# Patient Record
Sex: FEMALE | Race: WHITE | Employment: UNEMPLOYED | URBAN - METROPOLITAN AREA
[De-identification: names, ages, dates, MRNs, and addresses within clinical notes are randomized per-mention and may not be internally consistent; named-entity substitution may affect disease eponyms.]

---

## 2017-01-19 ENCOUNTER — OFFICE VISIT (OUTPATIENT)
Dept: BEHAVIORAL/MENTAL HEALTH CLINIC | Age: 27
End: 2017-01-19

## 2017-01-19 VITALS
DIASTOLIC BLOOD PRESSURE: 73 MMHG | BODY MASS INDEX: 20.8 KG/M2 | SYSTOLIC BLOOD PRESSURE: 110 MMHG | HEIGHT: 62 IN | WEIGHT: 113 LBS | HEART RATE: 88 BPM

## 2017-01-19 DIAGNOSIS — F41.9 ANXIETY: ICD-10-CM

## 2017-01-19 DIAGNOSIS — F31.76 BIPOLAR 1 DISORDER, DEPRESSED, FULL REMISSION (HCC): Primary | ICD-10-CM

## 2017-01-19 DIAGNOSIS — F41.0 PANIC ATTACKS: ICD-10-CM

## 2017-01-19 RX ORDER — CLONAZEPAM 0.5 MG/1
0.5 TABLET ORAL
Qty: 45 TAB | Refills: 1 | Status: SHIPPED | OUTPATIENT
Start: 2017-01-19 | End: 2017-03-30 | Stop reason: SDUPTHER

## 2017-01-19 NOTE — PROGRESS NOTES
CHIEF COMPLAINT:  Marissa Ramirez is a 27 y.o. female and was seen today for follow-up of psychiatric condition and psychotropic medication management. HPI:    Chavez Jaeger reports the following psychiatric symptoms:  depression, anxiety and margoth. The symptoms of anxiety have been present for months and are of moderate/low severity. Bipolar symptoms have been stable now for several months. Overall symptoms occur less frequently and pt has been working on awareness of anxiety in particular. She reports mood is stable and is here today to review tx plan. FAMILY/SOCIAL HX: conts to experience work stress    REVIEW OF SYSTEMS:  Psychiatric:  depression, anxiety, margoth  Appetite:good   Sleep: fitful, has been working to wean off of klonopin as a sleep aid, typically takes 0.5 mg to 0.75 mg   Neuro: denies    Visit Vitals    /73    Pulse 88    Ht 5' 2\" (1.575 m)    Wt 51.3 kg (113 lb)    BMI 20.67 kg/m2       Side Effects:  none    MENTAL STATUS EXAM:   Sensorium  oriented to time, place and person   Relations cooperative   Appearance:  age appropriate and casually dressed   Motor Behavior:  gait stable and within normal limits   Speech:  soft   Thought Process: goal directed   Thought Content free of delusions and free of hallucinations   Suicidal ideations no intention   Homicidal ideations no intention   Mood:  anxious and within normal limits   Affect:  Wnl, sl anxious   Memory recent  adequate   Memory remote:  adequate   Concentration:  adequate   Abstraction:  abstract   Insight:  fair and good   Reliability good   Judgment:  fair and good     MEDICAL DECISION MAKING:  Problems addressed today:    ICD-10-CM ICD-9-CM    1. Bipolar 1 disorder, depressed, full remission ([facility] F31.76 296.56    2. Anxiety F41.9 300.00    3. Panic attacks F41.0 300.01        Assessment:   Chavez Jaeger is responding to treatment. Bipolar symptoms are stable at this time.  Reviewed contributing factors and pt open/aware of risk factors for not being on medication. Continue to discuss factors to build MH and decrease stress. She still has some intermittent anxiety but is continuing to work to develop good coping strategies. Reviewed sleep meds. Pt not comfortable using an antidepressant due to concern about symptom exacerbation in the past from an antidepressant. Will dc it today. Pt will continue to work on weaning off of klonopin as a sleep aid. Currently she does not sleep well without it so will continue to wean slowly. Discussed dosing options. Provided psychoeducation re: bipolar management/stress management. Provided support and encouragement. Plan:   1. Current Outpatient Prescriptions   Medication Sig Dispense Refill    clonazePAM (KLONOPIN) 0.5 mg tablet Take 1 Tab by mouth two (2) times daily as needed. Max Daily Amount: 1 mg. 45 Tab 1          medication changes made today: dc doxepin, decrease klonopin to 0.5 mg tab 1 bid prn or 1-1.5 nightly as needed for sleep    2. Counseling and coordination of care including instructions for treatment, risks/benefits, risk factor reduction and patient/family education. She agrees with the plan. Patient instructed to call with any side effects, questions or issues. 3.  Follow-up Disposition:  Return in about 8 weeks (around 3/16/2017).      4. Ind therapy    1/19/2017  Roxana Ch NP

## 2017-01-19 NOTE — MR AVS SNAPSHOT
Visit Information Date & Time Provider Department Dept. Phone Encounter #  
 1/19/2017 10:00 AM Gaston Reyes NP [address] [facility] [phone] [id number] Upcoming Health Maintenance Date Due  
 HPV AGE 9Y-34Y (1 of 3 - Female 3 Dose Series) 5/22/2003 Pneumococcal 19-64 Medium Risk (1 of 1 - PPSV23) 5/22/2011 DTaP/Tdap/Td series (1 - Tdap) 5/22/2013 PAP AKA CERVICAL CYTOLOGY 5/22/2013 INFLUENZA AGE 9 TO ADULT 8/1/2016 Allergies as of 1/19/2017  Review Complete On: 1/19/2017 By: Gaston Reyes NP Severity Noted Reaction Type Reactions Cephalosporins  05/11/2011    Rash Seafood [Shellfish Containing Products]  05/11/2011    Shortness of Breath, Rash Current Immunizations  Never Reviewed No immunizations on file. Not reviewed this visit You Were Diagnosed With   
  
 Codes Comments Bipolar 1 disorder, depressed, full remission ([facility]    -  Primary ICD-10-CM: F31.76 
ICD-9-CM: 296.56 Anxiety     ICD-10-CM: F41.9 ICD-9-CM: 300.00 Panic attacks     ICD-10-CM: F41.0 ICD-9-CM: 300.01 Vitals BP Pulse Height(growth percentile) Weight(growth percentile) BMI OB Status 110/73 88 5' 2\" (1.575 m) 113 lb (51.3 kg) 20.67 kg/m2 Having regular periods Smoking Status Current Every Day Smoker Vitals History BMI and BSA Data Body Mass Index Body Surface Area  
 20.67 kg/m 2 1.5 m 2 Preferred Pharmacy Pharmacy Name Phone [address] [place] [address] [phone] [phone] Your Updated Medication List  
  
   
This list is accurate as of: 1/19/17 10:41 AM.  Always use your most recent med list.  
  
  
  
  
 clonazePAM 0.5 mg tablet Commonly known as:  So Sweetze Take 1 Tab by mouth two (2) times daily as needed. Max Daily Amount: 1 mg. Prescriptions Printed Refills clonazePAM (KLONOPIN) 0.5 mg tablet 1 Sig: Take 1 Tab by mouth two (2) times daily as needed. Max Daily Amount: 1 mg. Class: Print Route: Oral  
  
Introducing [facility]! Dear Deborah Rodríguez: Thank you for requesting a Mobileum account. Our records indicate that you have previously registered for a Mobileum account but its currently inactive. Please call our Mobileum support line at [phone]. Additional Information If you have questions, please visit the Frequently Asked Questions section of the Mobileum website at [email or web address] Remember, Mobileum is NOT to be used for urgent needs. For medical emergencies, dial 911. Now available from your iPhone and Android! Please provide this summary of care documentation to your next provider. Your primary care clinician is listed as Oliver Rizo. If you have any questions after today's visit, please call [phone].

## 2017-01-20 ENCOUNTER — HOSPITAL ENCOUNTER (EMERGENCY)
Age: 27
Discharge: HOME | End: 2017-01-20
Attending: EMERGENCY MEDICINE
Payer: COMMERCIAL

## 2017-01-20 VITALS
HEIGHT: 62 IN | OXYGEN SATURATION: 98 % | BODY MASS INDEX: 20.17 KG/M2 | RESPIRATION RATE: 16 BRPM | WEIGHT: 109.6 LBS | TEMPERATURE: 99.1 F | SYSTOLIC BLOOD PRESSURE: 110 MMHG | HEART RATE: 95 BPM | DIASTOLIC BLOOD PRESSURE: 68 MMHG

## 2017-01-20 DIAGNOSIS — R11.2 NAUSEA AND VOMITING, INTRACTABILITY OF VOMITING NOT SPECIFIED, UNSPECIFIED VOMITING TYPE: Primary | ICD-10-CM

## 2017-01-20 DIAGNOSIS — R19.7 DIARRHEA, UNSPECIFIED TYPE: ICD-10-CM

## 2017-01-20 LAB
ALBUMIN SERPL BCP-MCNC: 4.6 G/DL (ref 3.5–5)
ALBUMIN/GLOB SERPL: 1.4 {RATIO} (ref 1.1–2.2)
ALP SERPL-CCNC: 51 U/L (ref 45–117)
ALT SERPL-CCNC: 18 U/L (ref 12–78)
ANION GAP BLD CALC-SCNC: 11 MMOL/L (ref 5–15)
APPEARANCE UR: CLEAR
AST SERPL W P-5'-P-CCNC: 16 U/L (ref 15–37)
BACTERIA URNS QL MICRO: NEGATIVE /HPF
BASOPHILS # BLD AUTO: 0 K/UL (ref 0–0.1)
BASOPHILS # BLD: 0 % (ref 0–1)
BILIRUB SERPL-MCNC: 0.6 MG/DL (ref 0.2–1)
BILIRUB UR QL CFM: NEGATIVE
BUN SERPL-MCNC: 18 MG/DL (ref 6–20)
BUN/CREAT SERPL: 22 (ref 12–20)
CALCIUM SERPL-MCNC: 9.3 MG/DL (ref 8.5–10.1)
CHLORIDE SERPL-SCNC: 106 MMOL/L (ref 97–108)
CO2 SERPL-SCNC: 24 MMOL/L (ref 21–32)
COLOR UR: ABNORMAL
CREAT SERPL-MCNC: 0.82 MG/DL (ref 0.55–1.02)
DIFFERENTIAL METHOD BLD: ABNORMAL
EOSINOPHIL # BLD: 0 K/UL (ref 0–0.4)
EOSINOPHIL NFR BLD: 0 % (ref 0–7)
EPITH CASTS URNS QL MICRO: ABNORMAL /LPF
ERYTHROCYTE [DISTWIDTH] IN BLOOD BY AUTOMATED COUNT: 11.5 % (ref 11.5–14.5)
GLOBULIN SER CALC-MCNC: 3.3 G/DL (ref 2–4)
GLUCOSE SERPL-MCNC: 109 MG/DL (ref 65–100)
GLUCOSE UR STRIP.AUTO-MCNC: NEGATIVE MG/DL
HCG UR QL: NEGATIVE
HCT VFR BLD AUTO: 42.8 % (ref 35–47)
HGB BLD-MCNC: 14.6 G/DL (ref 11.5–16)
HGB UR QL STRIP: NEGATIVE
KETONES UR QL STRIP.AUTO: 40 MG/DL
LEUKOCYTE ESTERASE UR QL STRIP.AUTO: ABNORMAL
LIPASE SERPL-CCNC: 114 U/L (ref 73–393)
LYMPHOCYTES # BLD AUTO: 7 % (ref 12–49)
LYMPHOCYTES # BLD: 0.8 K/UL (ref 0.8–3.5)
MCH RBC QN AUTO: 30 PG (ref 26–34)
MCHC RBC AUTO-ENTMCNC: 34.1 G/DL (ref 30–36.5)
MCV RBC AUTO: 87.9 FL (ref 80–99)
MONOCYTES # BLD: 0.6 K/UL (ref 0–1)
MONOCYTES NFR BLD AUTO: 5 % (ref 5–13)
NEUTS BAND NFR BLD MANUAL: 2 % (ref 0–6)
NEUTS SEG # BLD: 9.8 K/UL (ref 1.8–8)
NEUTS SEG NFR BLD AUTO: 86 % (ref 32–75)
NITRITE UR QL STRIP.AUTO: NEGATIVE
PH UR STRIP: 7.5 [PH] (ref 5–8)
PLATELET # BLD AUTO: 202 K/UL (ref 150–400)
PLATELET COMMENTS,PCOM: ABNORMAL
POTASSIUM SERPL-SCNC: 3.8 MMOL/L (ref 3.5–5.1)
PROT SERPL-MCNC: 7.9 G/DL (ref 6.4–8.2)
PROT UR STRIP-MCNC: 100 MG/DL
RBC # BLD AUTO: 4.87 M/UL (ref 3.8–5.2)
RBC #/AREA URNS HPF: ABNORMAL /HPF (ref 0–5)
RBC MORPH BLD: ABNORMAL
SODIUM SERPL-SCNC: 141 MMOL/L (ref 136–145)
SP GR UR REFRACTOMETRY: 1.03 (ref 1–1.03)
UA: UC IF INDICATED,UAUC: ABNORMAL
UROBILINOGEN UR QL STRIP.AUTO: 0.2 EU/DL (ref 0.2–1)
WBC # BLD AUTO: 11.2 K/UL (ref 3.6–11)
WBC URNS QL MICRO: ABNORMAL /HPF (ref 0–4)

## 2017-01-20 PROCEDURE — 85025 COMPLETE CBC W/AUTO DIFF WBC: CPT | Performed by: EMERGENCY MEDICINE

## 2017-01-20 PROCEDURE — 80053 COMPREHEN METABOLIC PANEL: CPT | Performed by: EMERGENCY MEDICINE

## 2017-01-20 PROCEDURE — 81001 URINALYSIS AUTO W/SCOPE: CPT | Performed by: PHYSICIAN ASSISTANT

## 2017-01-20 PROCEDURE — 83690 ASSAY OF LIPASE: CPT | Performed by: EMERGENCY MEDICINE

## 2017-01-20 PROCEDURE — 74011250636 HC RX REV CODE- 250/636: Performed by: PHYSICIAN ASSISTANT

## 2017-01-20 PROCEDURE — 99284 EMERGENCY DEPT VISIT MOD MDM: CPT

## 2017-01-20 PROCEDURE — 96361 HYDRATE IV INFUSION ADD-ON: CPT

## 2017-01-20 PROCEDURE — 96374 THER/PROPH/DIAG INJ IV PUSH: CPT

## 2017-01-20 PROCEDURE — 36415 COLL VENOUS BLD VENIPUNCTURE: CPT | Performed by: EMERGENCY MEDICINE

## 2017-01-20 PROCEDURE — 81025 URINE PREGNANCY TEST: CPT

## 2017-01-20 RX ORDER — KETOROLAC TROMETHAMINE 30 MG/ML
30 INJECTION, SOLUTION INTRAMUSCULAR; INTRAVENOUS
Status: DISCONTINUED | OUTPATIENT
Start: 2017-01-20 | End: 2017-01-20 | Stop reason: HOSPADM

## 2017-01-20 RX ORDER — FAMOTIDINE 10 MG/ML
20 INJECTION INTRAVENOUS
Status: DISCONTINUED | OUTPATIENT
Start: 2017-01-20 | End: 2017-01-20 | Stop reason: SDUPTHER

## 2017-01-20 RX ORDER — ONDANSETRON 2 MG/ML
4 INJECTION INTRAMUSCULAR; INTRAVENOUS
Status: COMPLETED | OUTPATIENT
Start: 2017-01-20 | End: 2017-01-20

## 2017-01-20 RX ORDER — ONDANSETRON 4 MG/1
4 TABLET, ORALLY DISINTEGRATING ORAL
Qty: 12 TAB | Refills: 0 | Status: SHIPPED | OUTPATIENT
Start: 2017-01-20 | End: 2017-01-30

## 2017-01-20 RX ADMIN — ONDANSETRON 4 MG: 2 INJECTION INTRAMUSCULAR; INTRAVENOUS at 01:58

## 2017-01-20 RX ADMIN — SODIUM CHLORIDE 1000 ML: 900 INJECTION, SOLUTION INTRAVENOUS at 01:58

## 2017-01-20 RX ADMIN — SODIUM CHLORIDE 1000 ML: 900 INJECTION, SOLUTION INTRAVENOUS at 03:06

## 2017-01-20 NOTE — ED NOTES
PA reviewed discharge instructions and options with patient; patient verbalized understanding. RN reviewed discharge instructions using teachback method. Pt. Ambulated to exit without difficulty and in no signs of acute distress. Pt was escorted by boyfriend who will drive her home. Patient was counseled on medications prescribed at discharge.

## 2017-01-20 NOTE — ED TRIAGE NOTES
Patient arrives to ED with c/o diarrhea and vomiting since @ 1900 this evening, after dinner, patient also c/o abdo pain to both RUQ and LUQ of her abdo, no fever noted, no other complaint at this time. Patient also c/o pain to lower back at this time.

## 2017-01-20 NOTE — ED PROVIDER NOTES
HPI Comments: 28 yo female with hx of depression and anxiety here for evaluation of N/V/D. Symptoms began approximately 6 hours ago. Attempted to drink Pedialyte but unable to hold it down. Non bloody stool. Admits to cramping with diarrhea then pain subsides. Denies fever, CP, SOB, flank pain, urinary symptoms. +Smoker. Patient is a 27 y.o. female presenting with vomiting and diarrhea. The history is provided by the patient. Vomiting    This is a new problem. The current episode started 6 to 12 hours ago. The problem occurs more than 10 times per day. There has been no fever. Associated symptoms include diarrhea. Pertinent negatives include no fever and no cough. The patient is not pregnant. Diarrhea    Associated symptoms include diarrhea, nausea and vomiting. Pertinent negatives include no fever. Past Medical History:   Diagnosis Date    Depression     Other ill-defined conditions(693.61)      heart murmur    Psychiatric disorder      anxiety       Past Surgical History:   Procedure Laterality Date    Hx heent  2001     tonsillectomy    Hx orthopaedic  2011     left foot surgery         Family History:   Problem Relation Age of Onset    Hypertension Father        Social History     Social History    Marital status:      Spouse name: N/A    Number of children: N/A    Years of education: N/A     Occupational History    Not on file. Social History Main Topics    Smoking status: Current Every Day Smoker     Packs/day: 0.25    Smokeless tobacco: Never Used    Alcohol use No    Drug use: No    Sexual activity: Yes     Partners: Male     Other Topics Concern    Not on file     Social History Narrative         ALLERGIES: Cephalosporins and Seafood [shellfish containing products]    Review of Systems   Constitutional: Negative for activity change and fever. HENT: Negative for facial swelling. Eyes: Negative for discharge. Respiratory: Negative for cough. Cardiovascular: Negative for leg swelling. Gastrointestinal: Positive for diarrhea, nausea and vomiting. Negative for abdominal distention and anal bleeding. Skin: Negative for color change. Neurological: Negative for seizures and syncope. Psychiatric/Behavioral: Negative for behavioral problems. Vitals:    01/20/17 0138   BP: 104/73   Pulse: (!) 123   Resp: 16   Temp: 98.4 °F (36.9 °C)   SpO2: 98%   Weight: 49.7 kg (109 lb 9.6 oz)   Height: 5' 2\" (1.575 m)            Physical Exam   Constitutional: She is oriented to person, place, and time. She appears well-developed and well-nourished. HENT:   Head: Normocephalic and atraumatic. Right Ear: External ear normal.   Left Ear: External ear normal.   Nose: Nose normal.   Mouth/Throat: Oropharynx is clear and moist.   Eyes: Conjunctivae and EOM are normal. Pupils are equal, round, and reactive to light. Right eye exhibits no discharge. Left eye exhibits no discharge. Neck: Normal range of motion. Neck supple. Cardiovascular: Regular rhythm, normal heart sounds and intact distal pulses. Tachycardia   Pulmonary/Chest: Effort normal and breath sounds normal.   Abdominal: Soft. Bowel sounds are normal. She exhibits no distension. There is no tenderness. There is no rebound and no guarding. Musculoskeletal: Normal range of motion. She exhibits no edema or tenderness. Neurological: She is alert and oriented to person, place, and time. No cranial nerve deficit. Coordination normal.   Skin: Skin is warm and dry. No rash noted. Psychiatric: She has a normal mood and affect. Her behavior is normal. Judgment and thought content normal.   Nursing note and vitals reviewed.        MDM  Number of Diagnoses or Management Options  Diarrhea, unspecified type:   Nausea and vomiting, intractability of vomiting not specified, unspecified vomiting type:   Diagnosis management comments: 28 yo female with N/V/D x tonight; no fever, abd cramping but non tender abdomen; labs, antiemetic and fluids given; pt feeling \"much\" better. Abd remains soft; pt advised to return if any fever or worsening/continued symptoms. CARLOS Whitman         Amount and/or Complexity of Data Reviewed  Clinical lab tests: ordered and reviewed  Discuss the patient with other providers: yes      ED Course       Procedures    Patient has been reassessed. Feeling much better; abd soft. Reviewed labs, medications with patient. Ready to discharge home. Discussed case with attending Physician Lynda Lundberg. Agrees with care and will D/C with follow up.      3:49 AM  Patient's results have been reviewed with them. Patient and/or family have verbally conveyed their understanding and agreement of the patient's signs, symptoms, diagnosis, treatment and prognosis and additionally agree to follow up as recommended or return to the Emergency Room should their condition change prior to follow-up. Discharge instructions have also been provided to the patient with some educational information regarding their diagnosis as well a list of reasons why they would want to return to the ER prior to their follow-up appointment should their condition change.   CARLOS Whitman

## 2017-01-20 NOTE — DISCHARGE INSTRUCTIONS
Nausea and Vomiting: Care Instructions  Your Care Instructions    When you are nauseated, you may feel weak and sweaty and notice a lot of saliva in your mouth. Nausea often leads to vomiting. Most of the time you do not need to worry about nausea and vomiting, but they can be signs of other illnesses. Two common causes of nausea and vomiting are stomach flu and food poisoning. Nausea and vomiting from viral stomach flu will usually start to improve within 24 hours. Nausea and vomiting from food poisoning may last from 12 to 48 hours. The doctor has checked you carefully, but problems can develop later. If you notice any problems or new symptoms, get medical treatment right away. Follow-up care is a key part of your treatment and safety. Be sure to make and go to all appointments, and call your doctor if you are having problems. It's also a good idea to know your test results and keep a list of the medicines you take. How can you care for yourself at home? · To prevent dehydration, drink plenty of fluids, enough so that your urine is light yellow or clear like water. Choose water and other caffeine-free clear liquids until you feel better. If you have kidney, heart, or liver disease and have to limit fluids, talk with your doctor before you increase the amount of fluids you drink. · Rest in bed until you feel better. · When you are able to eat, try clear soups, mild foods, and liquids until all symptoms are gone for 12 to 48 hours. Other good choices include dry toast, crackers, cooked cereal, and gelatin dessert, such as Jell-O. When should you call for help? Call 911 anytime you think you may need emergency care. For example, call if:  · You passed out (lost consciousness). Call your doctor now or seek immediate medical care if:  · You have symptoms of dehydration, such as:  ¨ Dry eyes and a dry mouth. ¨ Passing only a little dark urine.   ¨ Feeling thirstier than usual.  · You have new or worsening belly pain. · You have a new or higher fever. · You vomit blood or what looks like coffee grounds. Watch closely for changes in your health, and be sure to contact your doctor if:  · You have ongoing nausea and vomiting. · Your vomiting is getting worse. · Your vomiting lasts longer than 2 days. · You are not getting better as expected. Where can you learn more? Go to http://jacki-halle.info/. Enter 25 [id number] in the search box to learn more about \"Nausea and Vomiting: Care Instructions. \"  Current as of: May 27, 2016  Content Version: 11.1  © 8531-1104 [employer]. Care instructions adapted under license by [employer] (which disclaims liability or warranty for this information). If you have questions about a medical condition or this instruction, always ask your healthcare professional. [address] any warranty or liability for your use of this information. Diarrhea: Care Instructions  Your Care Instructions    Diarrhea is loose, watery stools (bowel movements). The exact cause is often hard to find. Sometimes diarrhea is your body's way of getting rid of what caused an upset stomach. Viruses, food poisoning, and many medicines can cause diarrhea. Some people get diarrhea in response to emotional stress, anxiety, or certain foods. Almost everyone has diarrhea now and then. It usually isn't serious, and your stools will return to normal soon. The important thing to do is replace the fluids you have lost, so you can prevent dehydration. The doctor has checked you carefully, but problems can develop later. If you notice any problems or new symptoms, get medical treatment right away. Follow-up care is a key part of your treatment and safety. Be sure to make and go to all appointments, and call your doctor if you are having problems. It's also a good idea to know your test results and keep a list of the medicines you take.   How can you care for yourself at home? · Watch for signs of dehydration, which means your body has lost too much water. Dehydration is a serious condition and should be treated right away. Signs of dehydration are:  ¨ Increasing thirst and dry eyes and mouth. ¨ Feeling faint or lightheaded. ¨ Darker urine, and a smaller amount of urine than normal.  · To prevent dehydration, drink plenty of fluids, enough so that your urine is light yellow or clear like water. Choose water and other caffeine-free clear liquids until you feel better. If you have kidney, heart, or liver disease and have to limit fluids, talk with your doctor before you increase the amount of fluids you drink. · Begin eating small amounts of mild foods the next day, if you feel like it. ¨ Try yogurt that has live cultures of Lactobacillus. (Check the label.)  ¨ Avoid spicy foods, fruits, alcohol, and caffeine until 48 hours after all symptoms are gone. ¨ Avoid chewing gum that contains sorbitol. ¨ Avoid dairy products (except for yogurt with Lactobacillus) while you have diarrhea and for 3 days after symptoms are gone. · The doctor may recommend that you take over-the-counter medicine, such as loperamide (Imodium), if you still have diarrhea after 6 hours. Read and follow all instructions on the label. Do not use this medicine if you have bloody diarrhea, a high fever, or other signs of serious illness. Call your doctor if you think you are having a problem with your medicine. When should you call for help? Call 911 anytime you think you may need emergency care. For example, call if:  · You passed out (lost consciousness). · Your stools are maroon or very bloody. Call your doctor now or seek immediate medical care if:  · You are dizzy or lightheaded, or you feel like you may faint. · Your stools are black and look like tar, or they have streaks of blood. · You have new or worse belly pain.   · You have symptoms of dehydration, such as:  ¨ Dry eyes and a dry mouth. ¨ Passing only a little dark urine. ¨ Feeling thirstier than usual.  · You have a new or higher fever. Watch closely for changes in your health, and be sure to contact your doctor if:  · Your diarrhea is getting worse. · You see pus in the diarrhea. · You are not getting better after 2 days (48 hours). Where can you learn more? Go to http://jacki-halle.info/. Enter T469 in the search box to learn more about \"Diarrhea: Care Instructions. \"  Current as of: May 27, 2016  Content Version: 11.1  © 3665-7927 [employer]. Care instructions adapted under license by [employer] (which disclaims liability or warranty for this information). If you have questions about a medical condition or this instruction, always ask your healthcare professional. [address] any warranty or liability for your use of this information.

## 2017-01-20 NOTE — LETTER
[address] 
[address] [facility] [id number] 
[phone] Work/School Note Date: 1/20/2017 To Whom It May concern: 
 
Dino Gonzalez was seen and treated today in the emergency room by the following provider(s): 
Attending Provider: Ruddy Bee MD 
Physician Assistant: CARLOS Balderrama. Dino Gonzalez may return to work on Monday. Sincerely, CARLOS Balderrama

## 2017-03-29 ENCOUNTER — TELEPHONE (OUTPATIENT)
Dept: BEHAVIORAL/MENTAL HEALTH CLINIC | Age: 27
End: 2017-03-29

## 2017-03-30 RX ORDER — CLONAZEPAM 0.5 MG/1
0.5 TABLET ORAL
Qty: 45 TAB | Refills: 1 | OUTPATIENT
Start: 2017-03-30 | End: 2017-05-11 | Stop reason: SDUPTHER

## 2017-03-31 ENCOUNTER — TELEPHONE (OUTPATIENT)
Dept: BEHAVIORAL/MENTAL HEALTH CLINIC | Age: 27
End: 2017-03-31

## 2017-03-31 NOTE — TELEPHONE ENCOUNTER
Patient called and said pharmacy on file does not have her clonazapam... Looks like according to notes, lianna called it in yesterday. Can you look into this and see if there was a miscommunication, call the pharmacy, and maybe give them the refill again? Thanks!

## 2017-04-05 ENCOUNTER — OFFICE VISIT (OUTPATIENT)
Dept: BEHAVIORAL/MENTAL HEALTH CLINIC | Age: 27
End: 2017-04-05

## 2017-04-05 VITALS
HEART RATE: 82 BPM | SYSTOLIC BLOOD PRESSURE: 116 MMHG | BODY MASS INDEX: 20.43 KG/M2 | DIASTOLIC BLOOD PRESSURE: 76 MMHG | WEIGHT: 111 LBS | HEIGHT: 62 IN

## 2017-04-05 DIAGNOSIS — F41.9 ANXIETY: ICD-10-CM

## 2017-04-05 DIAGNOSIS — F41.0 PANIC ATTACKS: ICD-10-CM

## 2017-04-05 DIAGNOSIS — F31.76 BIPOLAR 1 DISORDER, DEPRESSED, FULL REMISSION (HCC): Primary | ICD-10-CM

## 2017-04-05 NOTE — PROGRESS NOTES
CHIEF COMPLAINT:  Eulogio Cardoza is a 27 y.o. female and was seen today for follow-up of psychiatric condition and psychotropic medication management. HPI:    Cooper Martínez reports the following psychiatric symptoms:  depression and anxiety. The symptoms have been present for months and are of moderate/high severity. The symptoms occur daily at this time. Pt reports increased stressors which has contributed to increased anxiety. Pt here today to review current treatment plan. FAMILY/SOCIAL HX: recently moved    REVIEW OF SYSTEMS:  Psychiatric:  anxiety  Appetite:good   Sleep: fitful but improved with prn klonopin  Neuro: denies    Visit Vitals    /76    Pulse 82    Ht 5' 2\" (1.575 m)    Wt 50.3 kg (111 lb)    BMI 20.3 kg/m2       Side Effects:  none    MENTAL STATUS EXAM:   Sensorium  oriented to time, place and person   Relations cooperative   Appearance:  age appropriate and casually dressed   Motor Behavior:  gait stable and within normal limits   Speech:  soft   Thought Process: goal directed   Thought Content free of delusions and free of hallucinations   Suicidal ideations no intention   Homicidal ideations no intention   Mood:  anxious   Affect:  anxious   Memory recent  adequate   Memory remote:  adequate   Concentration:  adequate   Abstraction:  abstract   Insight:  fair and good   Reliability good   Judgment:  good     MEDICAL DECISION MAKING:  Problems addressed today:    ICD-10-CM ICD-9-CM    1. Bipolar 1 disorder, depressed, full remission ([facility] F31.76 296.56    2. Anxiety F41.9 300.00    3. Panic attacks F41.0 300.01        Assessment:   Cooper Martínez is responding to treatment overall. Mood symptoms are stable at this time. She has had some increased stress at this time which contributed to insomnia and anxiety. Pt stated once she got the klonopin filled it helped with falling sleep. She continues to want to remain on limited medications as she hopes to get pregnant soon.  Pt only using klonopin prn. Will continue with current plan. Pt discussed recent stressors and reinforced positive strategies she is using. Provided support and encouragement. Plan:   1. Current Outpatient Prescriptions   Medication Sig Dispense Refill    clonazePAM (KLONOPIN) 0.5 mg tablet Take 1 Tab by mouth two (2) times daily as needed. Max Daily Amount: 1 mg. 45 Tab 1          medication changes made today: klonopin 0.5 mg prn anxiety/sleep    2. Counseling and coordination of care including instructions for treatment, risks/benefits, risk factor reduction and patient/family education. She agrees with the plan. Patient instructed to call with any side effects, questions or issues. 3.  Follow-up Disposition:  Return in about 4 weeks (around 5/3/2017).      4. Ind therapy prn    4/5/2017  Hector Levy NP

## 2017-04-05 NOTE — MR AVS SNAPSHOT
Visit Information Date & Time Provider Department Dept. Phone Encounter #  
 4/5/2017 10:30 AM Ino Ponce NP [facility] [phone] [id number] Follow-up Instructions Return in about 4 weeks (around 5/3/2017). Follow-up and Disposition History Upcoming Health Maintenance Date Due  
 HPV AGE 9Y-34Y (1 of 3 - Female 3 Dose Series) 5/22/2003 Pneumococcal 19-64 Medium Risk (1 of 1 - PPSV23) 5/22/2011 DTaP/Tdap/Td series (1 - Tdap) 5/22/2013 PAP AKA CERVICAL CYTOLOGY 5/22/2013 INFLUENZA AGE 9 TO ADULT 8/1/2016 Allergies as of 4/5/2017  Review Complete On: 4/5/2017 By: Ino Ponce NP Severity Noted Reaction Type Reactions Cephalosporins  05/11/2011    Rash Seafood [Shellfish Containing Products]  05/11/2011    Shortness of Breath, Rash Current Immunizations  Never Reviewed No immunizations on file. Not reviewed this visit You Were Diagnosed With   
  
 Codes Comments Bipolar 1 disorder, depressed, full remission ([facility]    -  Primary ICD-10-CM: F31.76 
ICD-9-CM: 296.56 Anxiety     ICD-10-CM: F41.9 ICD-9-CM: 300.00 Panic attacks     ICD-10-CM: F41.0 ICD-9-CM: 300.01 Vitals BP Pulse Height(growth percentile) Weight(growth percentile) BMI OB Status 116/76 82 5' 2\" (1.575 m) 111 lb (50.3 kg) 20.3 kg/m2 Having regular periods Smoking Status Current Every Day Smoker BMI and BSA Data Body Mass Index Body Surface Area  
 20.3 kg/m 2 1.48 m 2 Preferred Pharmacy Pharmacy Name Phone [facility]ACY #[id number]- [address] AT [address] [place] [address] [phone] Your Updated Medication List  
  
   
This list is accurate as of: 4/5/17 11:10 AM.  Always use your most recent med list.  
  
  
  
  
 clonazePAM 0.5 mg tablet Commonly known as:  Octavio Resides Take 1 Tab by mouth two (2) times daily as needed. Max Daily Amount: 1 mg. Follow-up Instructions Return in about 4 weeks (around 5/3/2017). Introducing [facility] & HEALTH SERVICES! Dear Adalberto Agent: Thank you for requesting a [employer] account. Our records indicate that you have previously registered for a Acumen account but its currently inactive. Please call our Acumen support line at [phone]. Additional Information If you have questions, please visit the Frequently Asked Questions section of the Acumen website at [email or web address] Remember, Acumen is NOT to be used for urgent needs. For medical emergencies, dial 911. Now available from your iPhone and Android! Please provide this summary of care documentation to your next provider. Your primary care clinician is listed as Dory Estrada. If you have any questions after today's visit, please call [phone].

## 2017-05-11 ENCOUNTER — OFFICE VISIT (OUTPATIENT)
Dept: BEHAVIORAL/MENTAL HEALTH CLINIC | Age: 27
End: 2017-05-11

## 2017-05-11 VITALS
HEIGHT: 62 IN | HEART RATE: 64 BPM | DIASTOLIC BLOOD PRESSURE: 68 MMHG | WEIGHT: 114 LBS | SYSTOLIC BLOOD PRESSURE: 95 MMHG | BODY MASS INDEX: 20.98 KG/M2

## 2017-05-11 DIAGNOSIS — F41.0 PANIC ATTACKS: ICD-10-CM

## 2017-05-11 DIAGNOSIS — F41.9 ANXIETY: ICD-10-CM

## 2017-05-11 DIAGNOSIS — F31.76 BIPOLAR 1 DISORDER, DEPRESSED, FULL REMISSION (HCC): Primary | ICD-10-CM

## 2017-05-11 RX ORDER — CLONAZEPAM 0.5 MG/1
0.5 TABLET ORAL
Qty: 45 TAB | Refills: 1 | Status: SHIPPED | OUTPATIENT
Start: 2017-05-11 | End: 2017-08-10 | Stop reason: SDUPTHER

## 2017-05-11 NOTE — PROGRESS NOTES
CHIEF COMPLAINT:  Minnie Anand is a 27 y.o. female and was seen today for follow-up of psychiatric condition and psychotropic medication management. HPI:    Tresa Webster reports the following psychiatric symptoms:  depression, anxiety, margoth and hypomania. The symptoms of bipolar disorder have been stable. Anxiety present several days per week and are of moderate severity. The symptoms occur less frequently in general and she she reports increasing awareness and ability to manage. Pt here today to review current treatment plan. FAMILY/SOCIAL HX: work stressors    REVIEW OF SYSTEMS:  Psychiatric:  depression, anxiety, margoth/hypomania  Appetite:good   Sleep: good overall, fitful at nights and using klonopin with benefit  Neuro: denies    Visit Vitals    BP 95/68    Pulse 64    Ht 5' 2\" (1.575 m)    Wt 51.7 kg (114 lb)    BMI 20.85 kg/m2       Side Effects:  none    MENTAL STATUS EXAM:   Sensorium  oriented to time, place and person   Relations cooperative   Appearance:  age appropriate and casually dressed   Motor Behavior:  gait stable and within normal limits   Speech:  normal volume   Thought Process: goal directed   Thought Content free of delusions and free of hallucinations   Suicidal ideations no intention   Homicidal ideations no intention   Mood:  within normal limits   Affect:  wnl   Memory recent  adequate   Memory remote:  adequate   Concentration:  adequate   Abstraction:  abstract   Insight:  fair and good   Reliability good   Judgment:  fair and good     MEDICAL DECISION MAKING:  Problems addressed today:    ICD-10-CM ICD-9-CM    1. Bipolar 1 disorder, depressed, full remission ([facility].) F31.76 296.56    2. Anxiety F41.9 300.00    3. Panic attacks F41.0 300.01        Assessment:   Tresa Webster is responding to treatment, symptoms are stable overall at this time. Reviewed [facility].  Discussed using klonopin at 1/2 tab some night s so when she does not use she does not notice possible withdrawal symptoms. Reviewed sleep hygiene as well as ongoing work to correct cognitive distortions. Pt today reported past use of an herbal substance and was asking if it may have contributed to her psychosis. Discussed possibility of a substance induced mood disorder. Reviewed goals for self care. Provided support and encouragement. Plan:   1. Current Outpatient Prescriptions   Medication Sig Dispense Refill    clonazePAM (KLONOPIN) 0.5 mg tablet Take 1 Tab by mouth two (2) times daily as needed. Max Daily Amount: 1 mg. 45 Tab 1          medication changes made today: klonopin 0.5 mg prn anxiety/sleep--take 1/2 tab more frequently than full tab    2. Counseling and coordination of care including instructions for treatment, risks/benefits, risk factor reduction and patient/family education. She agrees with the plan. Patient instructed to call with any side effects, questions or issues. 3.  Follow-up Disposition:  Return in about 8 weeks (around 7/6/2017).      4. Ind therapy    Pt seen for approx 25 minutes and greater than 50% of session was in counseling and coordination of care    5/11/2017  Marly Khan NP

## 2017-05-11 NOTE — MR AVS SNAPSHOT
Visit Information Date & Time Provider Department Dept. Phone Encounter #  
 5/11/2017 10:00 AM Carol Guzman NP [address] [facility] [phone] [id number] Follow-up Instructions Return in about 8 weeks (around 7/6/2017). Upcoming Health Maintenance Date Due  
 HPV AGE 9Y-34Y (1 of 3 - Female 3 Dose Series) 5/22/2003 Pneumococcal 19-64 Medium Risk (1 of 1 - PPSV23) 5/22/2011 DTaP/Tdap/Td series (1 - Tdap) 5/22/2013 PAP AKA CERVICAL CYTOLOGY 5/22/2013 INFLUENZA AGE 9 TO ADULT 8/1/2017 Allergies as of 5/11/2017  Review Complete On: 5/11/2017 By: Carol Guzman NP Severity Noted Reaction Type Reactions Cephalosporins  05/11/2011    Rash Seafood [Shellfish Containing Products]  05/11/2011    Shortness of Breath, Rash Current Immunizations  Never Reviewed No immunizations on file. Not reviewed this visit You Were Diagnosed With   
  
 Codes Comments Bipolar 1 disorder, depressed, full remission ([facility]    -  Primary ICD-10-CM: F31.76 
ICD-9-CM: 296.56 Anxiety     ICD-10-CM: F41.9 ICD-9-CM: 300.00 Panic attacks     ICD-10-CM: F41.0 ICD-9-CM: 300.01 Vitals BP Pulse Height(growth percentile) Weight(growth percentile) BMI OB Status 95/68 64 5' 2\" (1.575 m) 114 lb (51.7 kg) 20.85 kg/m2 Having regular periods Smoking Status Current Every Day Smoker Vitals History BMI and BSA Data Body Mass Index Body Surface Area  
 20.85 kg/m 2 1.5 m 2 Preferred Pharmacy [facility] Name [id number] [id number] [facility] AT [address] [place], [place] [phone] Your Updated Medication List  
  
   
This list is accurate as of: 5/11/17 10:31 AM.  Always use your most recent med list.  
  
  
  
  
 clonazePAM 0.5 mg tablet Commonly known as:  Dino [place] Take 1 Tab by mouth two (2) times daily as needed. Max Daily Amount: 1 mg. Prescriptions Printed Refills  
 clonazePAM (KLONOPIN) 0.5 mg tablet 1 Sig: Take 1 Tab by mouth two (2) times daily as needed. Max Daily Amount: 1 mg. Class: Print Route: Oral  
  
Follow-up Instructions Return in about 8 weeks (around 7/6/2017). Introducing [facility]! Dear Brent Jeter: Thank you for requesting a [employer] account. Our records indicate that you have previously registered for a [employer] account but its currently inactive. Please call our [employer] support line at [phone]. Additional Information If you have questions, please visit the Frequently Asked Questions section of the [employer] website at [email or web address] Remember, [employer] is NOT to be used for urgent needs. For medical emergencies, dial 911. Now available from your iPhone and Android! Please provide this summary of care documentation to your next provider. Your primary care clinician is listed as Erik Jordan. If you have any questions after today's visit, please call [phone].

## 2017-06-19 RX ORDER — CLONAZEPAM 0.5 MG/1
TABLET ORAL
Qty: 45 TAB | Refills: 1 | OUTPATIENT
Start: 2017-06-19

## 2017-08-10 ENCOUNTER — OFFICE VISIT (OUTPATIENT)
Dept: BEHAVIORAL/MENTAL HEALTH CLINIC | Age: 27
End: 2017-08-10

## 2017-08-10 VITALS
SYSTOLIC BLOOD PRESSURE: 108 MMHG | HEART RATE: 84 BPM | WEIGHT: 109 LBS | BODY MASS INDEX: 20.06 KG/M2 | DIASTOLIC BLOOD PRESSURE: 69 MMHG | HEIGHT: 62 IN

## 2017-08-10 DIAGNOSIS — F41.9 ANXIETY: ICD-10-CM

## 2017-08-10 DIAGNOSIS — F41.0 PANIC ATTACKS: ICD-10-CM

## 2017-08-10 DIAGNOSIS — F31.76 BIPOLAR 1 DISORDER, DEPRESSED, FULL REMISSION (HCC): Primary | ICD-10-CM

## 2017-08-10 RX ORDER — CLONAZEPAM 0.5 MG/1
0.5 TABLET ORAL
Qty: 45 TAB | Refills: 1 | Status: SHIPPED | OUTPATIENT
Start: 2017-08-10 | End: 2017-11-22 | Stop reason: SDUPTHER

## 2017-08-10 NOTE — MR AVS SNAPSHOT
Visit Information Date & Time Provider Department Dept. Phone Encounter #  
 8/10/2017 11:00 AM Yanique Ayala NP [address] [place] [phone] [id number] Follow-up Instructions Return in about 8 weeks (around 10/5/2017). Upcoming Health Maintenance Date Due  
 HPV AGE 9Y-34Y (1 of 3 - Female 3 Dose Series) 5/22/2003 Pneumococcal 19-64 Medium Risk (1 of 1 - PPSV23) 5/22/2011 DTaP/Tdap/Td series (1 - Tdap) 5/22/2013 PAP AKA CERVICAL CYTOLOGY 5/22/2013 INFLUENZA AGE 9 TO ADULT 8/1/2017 Allergies as of 8/10/2017  Review Complete On: 8/10/2017 By: Yanique Ayala NP Severity Noted Reaction Type Reactions Cephalosporins  05/11/2011    Rash Seafood [Shellfish Containing Products]  05/11/2011    Shortness of Breath, Rash Current Immunizations  Never Reviewed No immunizations on file. Not reviewed this visit You Were Diagnosed With   
  
 Codes Comments Bipolar 1 disorder, depressed, full remission ([facility]    -  Primary ICD-10-CM: F31.76 
ICD-9-CM: 296.56 Anxiety     ICD-10-CM: F41.9 ICD-9-CM: 300.00 Panic attacks     ICD-10-CM: F41.0 ICD-9-CM: 300.01 Vitals BP Pulse Height(growth percentile) Weight(growth percentile) BMI OB Status 108/69 84 5' 2\" (1.575 m) 109 lb (49.4 kg) 19.94 kg/m2 Having regular periods Smoking Status Current Every Day Smoker BMI and BSA Data Body Mass Index Body Surface Area 19.94 kg/m 2 1.47 m 2 Preferred Pharmacy Pharmacy Name Phone [facility]/PHARMACY #[id number]- [place] [id number] [facility] AT [address] [place] [address] [phone] Your Updated Medication List  
  
   
This list is accurate as of: 8/10/17 11:38 AM.  Always use your most recent med list.  
  
  
  
  
 clonazePAM 0.5 mg tablet Commonly known as:  Fabby Cord Take 1 Tab by mouth two (2) times daily as needed. Max Daily Amount: 1 mg. Do not take with other benzos, narcotics or alcohol. Prescriptions Printed Refills  
 clonazePAM (KLONOPIN) 0.5 mg tablet 1 Sig: Take 1 Tab by mouth two (2) times daily as needed. Max Daily Amount: 1 mg. Do not take with other benzos, narcotics or alcohol. Class: Print Route: Oral  
  
Follow-up Instructions Return in about 8 weeks (around 10/5/2017). Introducing [facility]! Dear Josué [place]: Thank you for requesting a [employer] account. Our records indicate that you have previously registered for a [employer] account but its currently inactive. Please call our [employer] support line at [phone]. Additional Information If you have questions, please visit the Frequently Asked Questions section of the [employer] website at [email or web address] Remember, [employer] is NOT to be used for urgent needs. For medical emergencies, dial 911. Now available from your iPhone and Android! Please provide this summary of care documentation to your next provider. Your primary care clinician is listed as [address] [place] [address]. If you have any questions after today's visit, please call [phone].

## 2017-08-10 NOTE — PROGRESS NOTES
CHIEF COMPLAINT:  Cynthia Cortez is a 24 y.o. female and was seen today for follow-up of psychiatric condition and psychotropic medication management. HPI:    Suraj Thompson reports the following psychiatric symptoms:  depression, anxiety, margoth and hypomania. The symptoms had been stable for several months. She has had some depression of moderate severity and she reports it lasted for approx 2 weeks. She notices her mood changes somewhat during her monthly cycle and she notices more depression. The symptoms occur less frequently now but pt concerned about recent exacerbation. Pt here today to review current treatment. FAMILY/SOCIAL HX: stress r/t  work/job    REVIEW OF SYSTEMS:  Psychiatric:  depression, hypomania/margoth by hx  Appetite:good   Sleep: good overall  Neuro: denies    Visit Vitals    /69    Pulse 84    Ht 5' 2\" (1.575 m)    Wt 49.4 kg (109 lb)    BMI 19.94 kg/m2       Side Effects:  none    MENTAL STATUS EXAM:   Sensorium  oriented to time, place and person   Relations cooperative   Appearance:  age appropriate and casually dressed   Motor Behavior:  gait stable and within normal limits   Speech:  soft   Thought Process: goal directed   Thought Content free of delusions and free of hallucinations   Suicidal ideations no intention, had some intermittent thoughts   Homicidal ideations no intention   Mood:  anxious and sad   Affect:  anxious and sad   Memory recent  adequate   Memory remote:  adequate   Concentration:  adequate   Abstraction:  abstract   Insight:  fair and good   Reliability good and fair   Judgment:  fair and good     MEDICAL DECISION MAKING:  Problems addressed today:    ICD-10-CM ICD-9-CM    1. Bipolar 1 disorder, depressed, full remission ([facility] 75.) F31.76 296.56    2. Anxiety F41.9 300.00    3. Panic attacks F41.0 300.01        Assessment:   Suraj Thompson is responding to treatment, symptoms are fairly stable today. Pt concerned about recent 2 week episode of depression. Reviewed symptoms to monitor for and discussed timing. At this time symptoms appear to be r/t monthly cycle and coming off of birth control. Discussed strategies to manage depression and anxiety. Reinforced importance of mindfulness activities. Pt continues to use klonopin most nights. Reviewed benzo safety.  checked and no red flags noted. Discussed impact of stress on MH. Reviewed healthy coping strategies. Provided support and encouragement. Plan:   1. Current Outpatient Prescriptions   Medication Sig Dispense Refill    clonazePAM (KLONOPIN) 0.5 mg tablet Take 1 Tab by mouth two (2) times daily as needed. Max Daily Amount: 1 mg. Do not take with other benzos, narcotics or alcohol. 45 Tab 1          medication changes made today: klonopin 0.5 mg bid prn anxiety    2. Counseling and coordination of care including instructions for treatment, risks/benefits, risk factor reduction and patient/family education. She agrees with the plan. Patient instructed to call with any side effects, questions or issues. 3.  Follow-up Disposition:  Return in about 8 weeks (around 10/5/2017). 4. Ind therapy    Spent 25 minutes with patient and greater than 50% of time spent was in counseling and coordination of care.     8/10/2017  Stefan Luo NP

## 2017-11-27 RX ORDER — CLONAZEPAM 0.5 MG/1
TABLET ORAL
Qty: 45 TAB | Refills: 0 | OUTPATIENT
Start: 2017-11-27 | End: 2018-01-23 | Stop reason: SDUPTHER

## 2017-12-06 ENCOUNTER — OFFICE VISIT (OUTPATIENT)
Dept: BEHAVIORAL/MENTAL HEALTH CLINIC | Age: 27
End: 2017-12-06

## 2017-12-06 VITALS — WEIGHT: 110.8 LBS | BODY MASS INDEX: 20.27 KG/M2

## 2017-12-06 DIAGNOSIS — F41.9 ANXIETY: ICD-10-CM

## 2017-12-06 DIAGNOSIS — F31.76 BIPOLAR 1 DISORDER, DEPRESSED, FULL REMISSION (HCC): Primary | ICD-10-CM

## 2017-12-06 DIAGNOSIS — F41.0 PANIC ATTACKS: ICD-10-CM

## 2017-12-06 DIAGNOSIS — G47.00 INSOMNIA, UNSPECIFIED TYPE: ICD-10-CM

## 2017-12-06 NOTE — MR AVS SNAPSHOT
Visit Information Date & Time Provider Department Dept. Phone Encounter #  
 12/6/2017 11:30 AM Jaime Goins NP [facility] [place] [phone] [id number] Follow-up Instructions Return in about 3 weeks (around 12/27/2017). Upcoming Health Maintenance Date Due  
 HPV AGE 9Y-34Y (1 of 3 - Female 3 Dose Series) 5/22/2003 Pneumococcal 19-64 Medium Risk (1 of 1 - PPSV23) 5/22/2011 DTaP/Tdap/Td series (1 - Tdap) 5/22/2013 PAP AKA CERVICAL CYTOLOGY 5/22/2013 Influenza Age 5 to Adult 8/1/2017 Allergies as of 12/6/2017  Review Complete On: 12/6/2017 By: Jaime Goins NP Severity Noted Reaction Type Reactions Cephalosporins  05/11/2011    Rash Seafood [Shellfish Containing Products]  05/11/2011    Shortness of Breath, Rash Current Immunizations  Never Reviewed No immunizations on file. Not reviewed this visit You Were Diagnosed With   
  
 Codes Comments Bipolar 1 disorder, depressed, full remission ([facility]    -  Primary ICD-10-CM: F31.76 
ICD-9-CM: 296.56 Anxiety     ICD-10-CM: F41.9 ICD-9-CM: 300.00 Panic attacks     ICD-10-CM: F41.0 ICD-9-CM: 300.01 Insomnia, unspecified type     ICD-10-CM: G47.00 ICD-9-CM: 780.52 Vitals Weight(growth percentile) BMI OB Status Smoking Status 110 lb 12.8 oz (50.3 kg) 20.27 kg/m2 Having regular periods Current Every Day Smoker BMI and BSA Data Body Mass Index Body Surface Area  
 20.27 kg/m 2 1.48 m 2 [id number] [address] AT [address] [place] [address] [phone] Your Updated Medication List  
  
   
This list is accurate as of: 12/6/17 12:33 PM.  Always use your most recent med list.  
  
  
  
  
 clonazePAM 0.5 mg tablet Commonly known as:  KlonoPIN  
TAKE 1 TABLET BY MOUTH TWICE A DAY AS NEEDED Follow-up Instructions Return in about 3 weeks (around 12/27/2017). Introducing \[facility]"" & HEALTH SERVICES! Dear Manuel Taylor: Thank you for requesting a [employer] account. Our records indicate that you have previously registered for a [employer] account but its currently inactive. Please call our [employer] support line at [phone]. Additional Information If you have questions, please visit the Frequently Asked Questions section of the [employer] website at [email or web address] Remember, [employer] is NOT to be used for urgent needs. For medical emergencies, dial 911. Now available from your iPhone and Android! Please provide this summary of care documentation to your next provider. Your primary care clinician is listed as [address]. If you have any questions after today's visit, please call [phone].

## 2017-12-06 NOTE — PROGRESS NOTES
CHIEF COMPLAINT:  Bakari Ervin is a 24 y.o. female and was seen today for follow-up of psychiatric condition and psychotropic medication management. HPI:    Yimi Hudson reports the following psychiatric symptoms:  depression, anxiety and margoth. The bipolar symptoms have been stable for several months. Anxiety symptoms are intermittent and moderate to high severity. The symptoms are made worse when pt experiences insomnia. Pt reports she has been working on relaxation strategies, however, she still notes significant . Pt here today to review current treatment plan. FAMILY/SOCIAL HX: work stressors intermittently    REVIEW OF SYSTEMS:  Psychiatric:  depression, anxiety, hypomania/margoth by hx  Appetite:no change from normal   Sleep: poor with DIMS (difficulty initiating & maintaining sleep)   Neuro: denies    Visit Vitals    Wt 50.3 kg (110 lb 12.8 oz)    BMI 20.27 kg/m2       Side Effects:  none    MENTAL STATUS EXAM:   Sensorium  oriented to time, place and person   Relations cooperative   Appearance:  age appropriate and casually dressed   Motor Behavior:  gait stable and within normal limits   Speech:  normal volume   Thought Process: goal directed   Thought Content free of delusions and free of hallucinations   Suicidal ideations no intention   Homicidal ideations no intention   Mood:  Anxious, sad   Affect:  Anxious, sad   Memory recent  adequate   Memory remote:  adequate   Concentration:  adequate   Abstraction:  abstract   Insight:  fair   Reliability good and fair   Judgment:  fair and good     MEDICAL DECISION MAKING:  Problems addressed today:    ICD-10-CM ICD-9-CM    1. Bipolar 1 disorder, depressed, full remission ([facility].) F31.76 296.56    2. Anxiety F41.9 300.00    3. Panic attacks F41.0 300.01    4. Insomnia, unspecified type G47.00 780.52        Assessment:   Yimi Hudson is responding to treatment overall and bipolar symptoms have remained stable.  Due to increased anxiety and risk of bipolar relapse associated with insomnia will work to help patient find beneficial sleep habits. Reviewed current strategies and reviewed options from least invasive (deep breathing, meditation etc) to medication options (OTC and prescription). Also reviewed possibility pt has been having some withdrawal effects and so will wean completely off klonopin and then only use prn as pt has currently been using 0.25-0.75 mg per night. Also, discussed use of ind therapy to address stress/anxiety issues and tendency to worry. Pt in resistant but open to considering. Reviewed S/S's of recurrent bipolar symptoms to monitor for. Reviewed potential for a bipolar episode even though she has been stable for quite some time. Provided support and answered questions. Reviewed weaning schedule. Plan:   1. Current Outpatient Prescriptions   Medication Sig Dispense Refill    clonazePAM (KLONOPIN) 0.5 mg tablet TAKE 1 TABLET BY MOUTH TWICE A DAY AS NEEDED 45 Tab 0          medication changes made today: decrease klonopin to 0.25 mg x 14 days and then dc    2. Counseling and coordination of care including instructions for treatment, risks/benefits, risk factor reduction and patient/family education. She agrees with the plan. Patient instructed to call with any side effects, questions or issues. 3.  Follow-up Disposition:  Return in about 3 weeks (around 12/27/2017).      4. Ind therapy    12/6/2017  Marly Christina NP

## 2018-01-23 DIAGNOSIS — F41.9 ANXIETY: Primary | ICD-10-CM

## 2018-01-23 RX ORDER — CLONAZEPAM 0.5 MG/1
0.5 TABLET ORAL
Qty: 45 TAB | Refills: 0 | OUTPATIENT
Start: 2018-01-23 | End: 2018-02-20 | Stop reason: SDUPTHER

## 2018-01-31 ENCOUNTER — OFFICE VISIT (OUTPATIENT)
Dept: BEHAVIORAL/MENTAL HEALTH CLINIC | Age: 28
End: 2018-01-31

## 2018-01-31 VITALS
HEIGHT: 62 IN | SYSTOLIC BLOOD PRESSURE: 112 MMHG | DIASTOLIC BLOOD PRESSURE: 79 MMHG | BODY MASS INDEX: 20.39 KG/M2 | WEIGHT: 110.8 LBS | HEART RATE: 123 BPM

## 2018-01-31 DIAGNOSIS — F41.0 PANIC ATTACKS: ICD-10-CM

## 2018-01-31 DIAGNOSIS — G47.00 INSOMNIA, UNSPECIFIED TYPE: ICD-10-CM

## 2018-01-31 DIAGNOSIS — F31.76 BIPOLAR 1 DISORDER, DEPRESSED, FULL REMISSION (HCC): Primary | ICD-10-CM

## 2018-01-31 NOTE — PROGRESS NOTES
CHIEF COMPLAINT:  Ezio Plaza is a 24 y.o. female and was seen today for follow-up of psychiatric condition and psychotropic medication management. HPI:    Ronda Cochran reports the following psychiatric symptoms by hx:  depression, anxiety, margoth and hypomania. The mood symptoms have been stable overall. Anxiety has been exacerbated at times. Pt states she has weaned off of klonopin but recently needed to use it for anxiety/sleep. Pt states she and her  would like to get pregnant this year. Pt here today to discuss treatment plan. FAMILY/SOCIAL HX: work stress    REVIEW OF SYSTEMS:  Psychiatric:  depression, anxiety, hx of margoth  Appetite:good   Sleep: poor with DIMS (difficulty initiating & maintaining sleep) intermittently, benefit from prn use of klonopin   Neuro: denies    Visit Vitals    /79    Pulse (!) 123    Ht 5' 2\" (1.575 m)    Wt 50.3 kg (110 lb 12.8 oz)    BMI 20.27 kg/m2       Side Effects:  none    MENTAL STATUS EXAM:   Sensorium  oriented to time, place and person   Relations cooperative   Appearance:  age appropriate and casually dressed   Motor Behavior:  gait stable and within normal limits   Speech:  normal volume   Thought Process: goal directed   Thought Content free of delusions and free of hallucinations   Suicidal ideations no intention   Homicidal ideations no intention   Mood:  anxious   Affect:  anxious   Memory recent  adequate   Memory remote:  adequate   Concentration:  adequate   Abstraction:  abstract   Insight:  good   Reliability good   Judgment:  good     MEDICAL DECISION MAKING:  Problems addressed today:    ICD-10-CM ICD-9-CM    1. Bipolar 1 disorder, depressed, full remission ([facility] F31.76 296.56    2. Panic attacks F41.0 300.01    3. Insomnia, unspecified type G47.00 780.52        Assessment:   Ronda Cochran is responding to treatment at this time. Reviewed hx of bipolar disorder and decision not to remain on maintenance medication.  Pt denies any bipolar symptoms and wonders if those episodes were stress/substance induced. Reviewed importance of ongoing monitoring. Pt discussed plans for pregnancy this year. Reviewed medication management. Pt finds she does need low dose klonopin intermittently for anxiety/sleep. Discussed risk of going several nights without sleep and pt plans to use medication for now. Provided support and encouragement. Reviewed short term and long term goals for tx and for med management during pregnancy. Plan:   1. Current Outpatient Prescriptions   Medication Sig Dispense Refill    clonazePAM (KLONOPIN) 0.5 mg tablet Take 1 Tab by mouth two (2) times daily as needed. Max Daily Amount: 1 mg. Do not take with other benzos, alcohol or narcotics. Indications: Anxiety 45 Tab 0          medication changes made today: cont klonopin 0.5 mg bid prn anxiety    2. Counseling and coordination of care including instructions for treatment, risks/benefits, risk factor reduction and patient/family education. She agrees with the plan. Patient instructed to call with any side effects, questions or issues. 3.  Follow-up Disposition:  Return if symptoms worsen or fail to improve.      4. Ind therapy    1/31/2018  Nikita Montoya NP

## 2018-01-31 NOTE — MR AVS SNAPSHOT
[address] [place] [address] 
[phone] Patient: Brett Castillo MRN: [id number] UIR:[id number] Visit Information Date & Time Provider Department Dept. Phone Encounter #  
 1/31/2018  1:00 PM Annemarie Orourke NP [address] [facility] [phone] [id number] Follow-up Instructions Return if symptoms worsen or fail to improve. Upcoming Health Maintenance Date Due  
 HPV AGE 9Y-34Y (1 of 3 - Female 3 Dose Series) 5/22/2003 Pneumococcal 19-64 Medium Risk (1 of 1 - PPSV23) 5/22/2011 DTaP/Tdap/Td series (1 - Tdap) 5/22/2013 PAP AKA CERVICAL CYTOLOGY 5/22/2013 Influenza Age 5 to Adult 8/1/2017 Allergies as of 1/31/2018  Review Complete On: 1/31/2018 By: Annemarie Orourke NP Severity Noted Reaction Type Reactions Cephalosporins  05/11/2011    Rash Seafood [Shellfish Containing Products]  05/11/2011    Shortness of Breath, Rash Current Immunizations  Never Reviewed No immunizations on file. Not reviewed this visit You Were Diagnosed With   
  
 Codes Comments Bipolar 1 disorder, depressed, full remission ([facility]    -  Primary ICD-10-CM: F31.76 
ICD-9-CM: 296.56 Panic attacks     ICD-10-CM: F41.0 ICD-9-CM: 300.01 Insomnia, unspecified type     ICD-10-CM: G47.00 ICD-9-CM: 780.52 Vitals BP Pulse Height(growth percentile) Weight(growth percentile) BMI OB Status 112/79 (!) 123 5' 2\" (1.575 m) 110 lb 12.8 oz (50.3 kg) 20.27 kg/m2 Having regular periods Smoking Status Current Every Day Smoker BMI and BSA Data Body Mass Index Body Surface Area  
 20.27 kg/m 2 1.48 m 2 Preferred Pharmacy Pharmacy Name Phone [id number] [place] [id number] [facility] AT [address] [place] [address] [phone] Your Updated Medication List  
  
   
 This list is accurate as of: 1/31/18  2:14 PM.  Always use your most recent med list.  
  
  
  
  
 clonazePAM 0.5 mg tablet Commonly known as:  Kalastephen Machado Take 1 Tab by mouth two (2) times daily as needed. Max Daily Amount: 1 mg. Do not take with other benzos, alcohol or narcotics. Indications: Anxiety Follow-up Instructions Return if symptoms worsen or fail to improve. Introducing [facility]! Dear Janki Fuentes: Thank you for requesting a [employer] account. Our records indicate that you have previously registered for a [employer] account but its currently inactive. Please call our [employer] support line at [phone]. Additional Information If you have questions, please visit the Frequently Asked Questions section of the [employer] website at [email or web address] Remember, [employer] is NOT to be used for urgent needs. For medical emergencies, dial 911. Now available from your iPhone and Android! Please provide this summary of care documentation to your next provider. Your primary care clinician is listed as [address] [place] [address]. If you have any questions after today's visit, please call [phone].

## 2018-02-20 ENCOUNTER — TELEPHONE (OUTPATIENT)
Dept: BEHAVIORAL/MENTAL HEALTH CLINIC | Age: 28
End: 2018-02-20

## 2018-02-20 DIAGNOSIS — F41.9 ANXIETY: ICD-10-CM

## 2018-02-20 RX ORDER — CLONAZEPAM 0.5 MG/1
0.5 TABLET ORAL
Qty: 45 TAB | Refills: 1 | OUTPATIENT
Start: 2018-02-20 | End: 2018-05-16 | Stop reason: SDUPTHER

## 2018-02-20 RX ORDER — RISPERIDONE 0.5 MG/1
0.5 TABLET, FILM COATED ORAL
Qty: 30 TAB | Refills: 1 | Status: SHIPPED | OUTPATIENT
Start: 2018-02-20 | End: 2018-03-23 | Stop reason: SDUPTHER

## 2018-02-20 NOTE — TELEPHONE ENCOUNTER
Pt called and asked if there was any availability this week to see you. There is none but she would like a call if possible this week, said she has been having a rough time.

## 2018-02-20 NOTE — TELEPHONE ENCOUNTER
Returned call. Pt reports she was not sleeping well over the past week and she noticed an increase in hypomania/margoth. She took klonopin and was able to finally get some sleep and symptoms have resolved. She also has been working to decrease stress. Reviewed options and will use risperdal. Reviewed risk associated with only using a medication prn for bipolar disorder and recommended treatment options. Pt aware of risks and states she will consider daily use.

## 2018-03-23 RX ORDER — RISPERIDONE 0.5 MG/1
0.5 TABLET, FILM COATED ORAL
Qty: 90 TAB | Refills: 0 | Status: SHIPPED | OUTPATIENT
Start: 2018-03-23 | End: 2019-12-24 | Stop reason: SDUPTHER

## 2018-05-16 DIAGNOSIS — F41.9 ANXIETY: ICD-10-CM

## 2018-05-17 RX ORDER — CLONAZEPAM 0.5 MG/1
TABLET ORAL
Qty: 45 TAB | Refills: 1 | OUTPATIENT
Start: 2018-05-17 | End: 2018-07-25 | Stop reason: SDUPTHER

## 2018-07-25 DIAGNOSIS — F41.9 ANXIETY: ICD-10-CM

## 2018-07-25 NOTE — TELEPHONE ENCOUNTER
Patient has \"aged out\" of insurance and is in the process of finding another policy, which is why she has not scheduled follow up. She reports she is doing well, but does miss seeing Jossy Ruiz.

## 2018-07-26 RX ORDER — CLONAZEPAM 0.5 MG/1
0.5 TABLET ORAL
Qty: 45 TAB | Refills: 1 | OUTPATIENT
Start: 2018-07-26 | End: 2020-04-20 | Stop reason: SDUPTHER

## 2019-08-05 ENCOUNTER — OFFICE VISIT (OUTPATIENT)
Dept: BEHAVIORAL/MENTAL HEALTH CLINIC | Age: 29
End: 2019-08-05

## 2019-08-05 VITALS
SYSTOLIC BLOOD PRESSURE: 104 MMHG | WEIGHT: 131 LBS | DIASTOLIC BLOOD PRESSURE: 72 MMHG | HEIGHT: 62 IN | HEART RATE: 82 BPM | BODY MASS INDEX: 24.11 KG/M2

## 2019-08-05 DIAGNOSIS — F31.76 BIPOLAR 1 DISORDER, DEPRESSED, FULL REMISSION (HCC): Primary | ICD-10-CM

## 2019-08-05 NOTE — PROGRESS NOTES
CHIEF COMPLAINT:  Kieran Kidd is a 34 y.o. female and was seen today for follow-up of psychiatric condition and psychotropic medication management and therapy. HPI:    Ines Blank reports the following psychiatric symptoms:  depression, anxiety, margoth and hypomania. The symptoms have been stable for 1-2 years and are of moderate/low severity. The symptoms occur less frequently and less severely overall. Pt states she was able to get through pregnancy and delivery without evidence of recurrent symptoms. Pt states she is concerned about recurrent symptoms due to decreased sleep with a . She is here today to review current status and update treatment plan as needed. PHQ-9: 3, negative screen for depression  HAM-A: 8, mild anxiety  MOOD DISORDER QUESTIONNAIRE: positive, positive hx of margoth      FAMILY/SOCIAL HX: delivery of first child    REVIEW OF SYSTEMS:  Psychiatric:  depression, anxiety, hypomania/margoth by hx, psychosis by hx  Appetite:good   Sleep: decreased more than normal, baby's schedule is nursing every 2.5 hours  Neuro: denies    Visit Vitals  /72 (BP 1 Location: Left arm, BP Patient Position: Sitting)   Pulse 82   Ht 5' 2\" (1.575 m)   Wt 59.4 kg (131 lb)   BMI 23.96 kg/m²       Side Effects:  none    MENTAL STATUS EXAM:   Sensorium  oriented to time, place and person   Relations cooperative   Appearance:  age appropriate and casually dressed   Motor Behavior:  gait stable and within normal limits   Speech:  normal volume   Thought Process: goal directed   Thought Content free of delusions and free of hallucinations   Suicidal ideations no intention   Homicidal ideations no intention   Mood:  anxious   Affect:  anxious   Memory recent  adequate   Memory remote:  adequate   Concentration:  adequate   Abstraction:  abstract   Insight:  good   Reliability good   Judgment:  good     MEDICAL DECISION MAKING:  Problems addressed today:    ICD-10-CM ICD-9-CM    1.  Bipolar 1 disorder, depressed, full remission ([email or web address] F31.76 296.56        Assessment:   Enrico Stoll is responding to treatment. Pt has been off of medications for her pregnancy and now post partum. She is nursing and wants to remain medication free. Currently symptoms are stable. She states she is concerned about sleep as she knows this is a risk factor. Reviewed short term and long term goals. Also discussed strategies for self care/stress reduction. Reviewed medication goals and symptoms to monitor for. Provided support and encouragement. Plan:   1. Current Outpatient Medications   Medication Sig Dispense Refill    PNV MT.93/EXJUUMF fum/folic ac (PRENATAL MULTIVITAMINS PO) Take  by mouth.  clonazePAM (KLONOPIN) 0.5 mg tablet Take 1 Tab by mouth two (2) times daily as needed. Max Daily Amount: 1 mg. Do not take with other benzo's, narcotics or alcohol. Indications: Anxiety 45 Tab 1    risperiDONE (RISPERDAL) 0.5 mg tablet Take 1 Tab by mouth nightly as needed. 90 Tab 0          medication changes made today: no meds prescribed currently    2. Counseling and coordination of care including instructions for treatment, risks/benefits, risk factor reduction and patient/family education. She agrees with the plan. Patient instructed to call with any side effects, questions or issues. 3.    Follow-up and Dispositions    · Return in about 2 months (around 10/5/2019). PSYCHOTHERAPY: 20/25 minutes  TYPE: psychodynamic, cognitive  FOCUS: stress management, self care, maintenance of a chronic condition  PROGRESS: Pt continues to make progress. She is aware of past symptoms/history and health goals to be mindful of. Discussed concerns about health and fears of recurrent margoth. Worked to correct distortions about guilt/health care activities.      8/6/2019  Gavino Barba NP

## 2019-12-02 ENCOUNTER — TELEPHONE (OUTPATIENT)
Dept: BEHAVIORAL/MENTAL HEALTH CLINIC | Age: 29
End: 2019-12-02

## 2019-12-02 NOTE — TELEPHONE ENCOUNTER
Returned call. Pt has been experiencing hypomania/margoth and is about to admitted to [facility]. Reviewed past use of risperdal and lithium for stabilization.  Pt asking to have f/u appt moved up once she is DC'd from hospital.

## 2019-12-02 NOTE — TELEPHONE ENCOUNTER
Pt calls to speak with provider. Informed her you are in clinic. Pt said she is currently at [facility], feels unstable, wants to talk to you.     [phone]

## 2019-12-17 ENCOUNTER — TELEPHONE (OUTPATIENT)
Dept: BEHAVIORAL/MENTAL HEALTH CLINIC | Age: 29
End: 2019-12-17

## 2019-12-17 NOTE — TELEPHONE ENCOUNTER
Returned call. Discussed ongoing SE's such as dizziness and and fatigue. Reviewed dc meds and pt reports Lithium 900 mg and Risperdal 3 mg. She states her last lithium level was 1.0 or 0.9. Advised pt to take 1/2 tab of risperdal and monitor to see if symptoms become exacerbated. Will f/u with pt on 12/24/19. Pt aware of importance of going to ED if symptoms become exacerbated.

## 2019-12-17 NOTE — TELEPHONE ENCOUNTER
Pt left  asking for a call back from [place]. Said the risperdone is making her dizzy, weak, and fatigued. She is wondering if its too much, too fast. Wants to know if she needs to wait it out or change something? She said its been 2 weeks.

## 2019-12-24 ENCOUNTER — OFFICE VISIT (OUTPATIENT)
Dept: BEHAVIORAL/MENTAL HEALTH CLINIC | Age: 29
End: 2019-12-24

## 2019-12-24 VITALS
DIASTOLIC BLOOD PRESSURE: 90 MMHG | SYSTOLIC BLOOD PRESSURE: 120 MMHG | HEIGHT: 62 IN | WEIGHT: 120 LBS | BODY MASS INDEX: 22.08 KG/M2

## 2019-12-24 DIAGNOSIS — F31.12 BIPOLAR 1 DISORDER, MANIC, MODERATE (HCC): Primary | ICD-10-CM

## 2019-12-24 RX ORDER — RISPERIDONE 3 MG/1
3 TABLET, FILM COATED ORAL
Qty: 30 TAB | Refills: 2 | Status: SHIPPED | OUTPATIENT
Start: 2019-12-24 | End: 2020-01-15 | Stop reason: SDUPTHER

## 2019-12-24 RX ORDER — LITHIUM CARBONATE 300 MG/1
300 CAPSULE ORAL 3 TIMES DAILY
Qty: 90 CAP | Refills: 2 | Status: SHIPPED | OUTPATIENT
Start: 2019-12-24 | End: 2019-12-24 | Stop reason: SDUPTHER

## 2019-12-24 RX ORDER — LITHIUM CARBONATE 300 MG/1
300 CAPSULE ORAL 3 TIMES DAILY
Qty: 90 CAP | Refills: 2 | Status: SHIPPED | OUTPATIENT
Start: 2019-12-24 | End: 2020-01-15 | Stop reason: SDUPTHER

## 2019-12-24 RX ORDER — RISPERIDONE 3 MG/1
3 TABLET, FILM COATED ORAL
Qty: 30 TAB | Refills: 2 | Status: SHIPPED | OUTPATIENT
Start: 2019-12-24 | End: 2019-12-24 | Stop reason: SDUPTHER

## 2019-12-24 NOTE — PROGRESS NOTES
CHIEF COMPLAINT:  Chiqui Zarate is a 34 y.o. female and was seen today for follow-up of psychiatric condition and psychotropic medication management. HPI:    Felix Galeazzi reports the following psychiatric symptoms:  depression, anxiety, margoth, hypomania and psychosis. The symptoms have been present for months and are of moderate/high severity. The symptoms occur at moderate to severe levels. Pt reports a recent hospitalization for margoth with psychosis. She had been having some side effects from medications so had reduced dosages somewhat. Pt then went off of medication and experienced exacerbated margoth and psychosis. She is here today to review current treatment plan. FAMILY/SOCIAL HX: currently not working    REVIEW OF SYSTEMS:  Psychiatric:  depression, agitation, anxiety, hypomania/margoth  Appetite:good/limited   Sleep: decreased more than normal   Neuro: denies    Visit Vitals  /90 (BP 1 Location: Left arm, BP Patient Position: Sitting)   Ht 5' 2\" (1.575 m)   Wt 54.4 kg (120 lb)   BMI 21.95 kg/m²       Side Effects:  somnolence    MENTAL STATUS EXAM:   Sensorium  oriented to time, place and person   Relations cooperative   Appearance:  age appropriate and casually dressed   Motor Behavior:  gait stable and within normal limits   Speech:  hyperverbal   Thought Process: goal directed and tangential   Thought Content Reports recent delusions and free of hallucinations   Suicidal ideations no intention   Homicidal ideations no intention   Mood:  euphoric   Affect:  euphoric   Memory recent  adequate   Memory remote:  adequate   Concentration:  adequate   Abstraction:  abstract   Insight:  fair and limited   Reliability fair   Judgment:  fair and limited     MEDICAL DECISION MAKING:  Problems addressed today:    ICD-10-CM ICD-9-CM    1. Bipolar 1 disorder, manic, moderate ([facility]) F31.12 296.42 LITHIUM       Assessment:   Felix Galeazzi had been responding to treatment overall post hospitalization.  As noted above she stopped using haldol and lithium and experienced recurrent symptoms. Reviewed treatment goals in full and risk vs benefit of using prescription medication for use of bipolar management. Pt in agreement with med use and plans to re-start doses used at MI. Reviewed acute phase and management phase dosing with patient. Will send a lithium lab slip for pt. To have lithium level done in approx 7-14 days. Plan:   1. Current Outpatient Medications   Medication Sig Dispense Refill    risperiDONE (RISPERDAL) 3 mg tablet Take 1 Tab by mouth nightly. 30 Tab 2    lithium carbonate 300 mg capsule Take 1 Cap by mouth three (3) times daily. 90 Cap 2    PNV RS.04/AJHSDAI fum/folic ac (PRENATAL MULTIVITAMINS PO) Take  by mouth.  clonazePAM (KLONOPIN) 0.5 mg tablet Take 1 Tab by mouth two (2) times daily as needed. Max Daily Amount: 1 mg. Do not take with other benzo's, narcotics or alcohol. Indications: Anxiety 45 Tab 1          medication changes made today: risperdal 3 mg, lithium 300 mg tid    2. Counseling and coordination of care including instructions for treatment, risks/benefits, risk factor reduction and patient/family education. She agrees with the plan. Patient instructed to call with any side effects, questions or issues. 3.    Follow-up and Dispositions    · Return in about 4 weeks (around 1/21/2020).        4. Lithium level in 7-14 days    12/24/2019  Margy Cervantes NP

## 2019-12-26 ENCOUNTER — TELEPHONE (OUTPATIENT)
Dept: BEHAVIORAL/MENTAL HEALTH CLINIC | Age: 29
End: 2019-12-26

## 2019-12-26 NOTE — TELEPHONE ENCOUNTER
Pt calls to report she has been having \"pretty intense\" panic attacks this week. Has been out of the hospital about 10 days, and was seen in office on 12/24. She would like to speak with provider. Aware provider will be back in office on Friday and will go to ED if she feels she needs to.

## 2020-01-10 ENCOUNTER — TELEPHONE (OUTPATIENT)
Dept: BEHAVIORAL/MENTAL HEALTH CLINIC | Age: 30
End: 2020-01-10

## 2020-01-15 ENCOUNTER — TELEPHONE (OUTPATIENT)
Dept: BEHAVIORAL/MENTAL HEALTH CLINIC | Age: 30
End: 2020-01-15

## 2020-01-15 DIAGNOSIS — F31.12 BIPOLAR 1 DISORDER, MANIC, MODERATE (HCC): Primary | ICD-10-CM

## 2020-01-15 RX ORDER — CLONAZEPAM 1 MG/1
1 TABLET ORAL
Qty: 45 TAB | Refills: 0 | Status: SHIPPED | OUTPATIENT
Start: 2020-01-15 | End: 2020-03-17 | Stop reason: SDUPTHER

## 2020-01-15 RX ORDER — RISPERIDONE 2 MG/1
2 TABLET, FILM COATED ORAL
Qty: 30 TAB | Refills: 0
Start: 2020-01-15 | End: 2020-03-02 | Stop reason: SDUPTHER

## 2020-01-15 RX ORDER — LITHIUM CARBONATE 450 MG/1
450 TABLET ORAL DAILY
Qty: 30 TAB | Refills: 0
Start: 2020-01-15 | End: 2021-01-07 | Stop reason: DRUGHIGH

## 2020-01-15 RX ORDER — LITHIUM CARBONATE 300 MG/1
300 CAPSULE ORAL 2 TIMES DAILY WITH MEALS
Qty: 60 CAP | Refills: 0
Start: 2020-01-15 | End: 2020-03-09 | Stop reason: SDUPTHER

## 2020-01-15 NOTE — TELEPHONE ENCOUNTER
Pt called in to review recent hospitalization and current dosages. Updated her med list and agreed to use prn klonopin for sleep and/or a panic attack. Reviewed PRN guidelines.

## 2020-01-20 ENCOUNTER — OFFICE VISIT (OUTPATIENT)
Dept: BEHAVIORAL/MENTAL HEALTH CLINIC | Age: 30
End: 2020-01-20

## 2020-01-20 VITALS
HEART RATE: 74 BPM | SYSTOLIC BLOOD PRESSURE: 111 MMHG | HEIGHT: 62 IN | WEIGHT: 130 LBS | DIASTOLIC BLOOD PRESSURE: 75 MMHG | BODY MASS INDEX: 23.92 KG/M2

## 2020-01-20 DIAGNOSIS — F31.60 BIPOLAR 1 DISORDER, MIXED (HCC): Primary | ICD-10-CM

## 2020-01-20 NOTE — PROGRESS NOTES
CHIEF COMPLAINT:  Arta Goldberg is a 34 y.o. female and was seen today for follow-up of psychiatric condition and psychotropic medication management. HPI:    Otf Vazquez reports the following psychiatric symptoms:  depression, agitation, anxiety, margoth, hypomania and psychosis. The symptoms have been present for several weeks to months and are of moderate/high severity. The symptoms occur somewhat less severely since pt was discharged for inpatient. Overall she reports benefit from current medications. She continues to feel frustrated that she has to be on medication. She is here today to reviewed current treatment plan. FAMILY/SOCIAL HX: no recent changes    REVIEW OF SYSTEMS:  Psychiatric:  depression, anxiety, agitation, hypomania/margoth, psychosis  Appetite:good   Sleep: poor with DIMS (difficulty initiating & maintaining sleep) at times, reports she is going to bed early   Neuro: denies    Visit Vitals  /75 (BP 1 Location: Left arm, BP Patient Position: Sitting)   Pulse 74   Ht 5' 2\" (1.575 m)   Wt 59 kg (130 lb)   BMI 23.78 kg/m²       Side Effects:  none    MENTAL STATUS EXAM:   Sensorium  oriented to time, place and person   Relations cooperative   Appearance:  age appropriate and casually dressed   Motor Behavior:  gait stable and within normal limits   Speech:  soft   Thought Process: goal directed   Thought Content free of delusions and free of hallucinations   Suicidal ideations no intention   Homicidal ideations no intention   Mood:  anxious, depressed and labile   Affect:  anxious, depressed, increased in intensity and labile   Memory recent  adequate   Memory remote:  adequate   Concentration:  adequate   Abstraction:  abstract   Insight:  fair   Reliability fair   Judgment:  fair     MEDICAL DECISION MAKING:  Problems addressed today:    ICD-10-CM ICD-9-CM    1. Bipolar 1 disorder, mixed ([facility] F31.60 296.60        Assessment:   Otf Vazquez is responding to treatment overall.  Currently symptoms are moderately exacerbated. She recently was discharged from 2nd inpatient hospital stay. Reviewed current medications and dosages. Pt currently taking Lithium 450 mg in am and 600 mg at bedtime. She is also taking risperdal 2 mg at bedtime. No changes required at this time. Mood is labile and so will check a lithium level at next appt and will consider med adjustments if needed. Pt with increasing depression. Agreed to monitor closely and will consider switching risperdal if needed. Reviewed impact of living with bipolar disorder. Provided psychoeducation on bipolar disorder and treatment goals. Provided support and answered questions. Plan:   1. Current Outpatient Medications   Medication Sig Dispense Refill    lithium carbonate 300 mg capsule Take 1 Cap by mouth two (2) times daily (with meals). 60 Cap 0    lithium carbonate CR (ESKALITH CR) 450 mg CR tablet Take 1 Tab by mouth daily. 30 Tab 0    risperiDONE (RISPERDAL) 2 mg tablet Take 1 Tab by mouth nightly. 30 Tab 0    clonazePAM (KLONOPIN) 1 mg tablet Take 1 Tab by mouth two (2) times daily as needed for Anxiety. Max Daily Amount: 2 mg. 45 Tab 0    PNV DX.23/OROZZDU fum/folic ac (PRENATAL MULTIVITAMINS PO) Take  by mouth.  clonazePAM (KLONOPIN) 0.5 mg tablet Take 1 Tab by mouth two (2) times daily as needed. Max Daily Amount: 1 mg. Do not take with other benzo's, narcotics or alcohol. Indications: Anxiety 45 Tab 1          medication changes made today: cont lithium 450 mg/600 mg, cont risperdal 2 mg, cont klonopin 1 mg bid prn anxiety/panic attacks    2. Counseling and coordination of care including instructions for treatment, risks/benefits, risk factor reduction and patient/family education. She agrees with the plan. Patient instructed to call with any side effects, questions or issues. 3.    Follow-up and Dispositions    · Return in about 2 weeks (around 2/3/2020). 4. Lithium level at next appt    5.  Ind therapy    1/20/2020  Sammi Pham NP

## 2020-02-04 ENCOUNTER — OFFICE VISIT (OUTPATIENT)
Dept: BEHAVIORAL/MENTAL HEALTH CLINIC | Age: 30
End: 2020-02-04

## 2020-02-04 DIAGNOSIS — F31.60 BIPOLAR 1 DISORDER, MIXED (HCC): Primary | ICD-10-CM

## 2020-02-04 DIAGNOSIS — F41.9 ANXIETY: ICD-10-CM

## 2020-02-04 RX ORDER — QUETIAPINE FUMARATE 25 MG/1
25 TABLET, FILM COATED ORAL
Qty: 30 TAB | Refills: 0 | Status: SHIPPED | OUTPATIENT
Start: 2020-02-04 | End: 2020-03-19 | Stop reason: SDUPTHER

## 2020-02-04 NOTE — PROGRESS NOTES
CHIEF COMPLAINT:  Meme Campos is a 34 y.o. female and was seen today for follow-up of psychiatric condition and psychotropic medication management. HPI:    Sigrid Merino reports the following psychiatric symptoms:  depression, agitation, delusions, anxiety, margoth, hypomania and psychosis. The symptoms have been present for weeks now and are of moderate/high severity. The symptoms occur daily. Pt reports symptoms are stabilizing but overall she reports some intermittent exacerbations of high levels of anxiety, delusions and hypomanic symptoms. Pt was DC'd from inpatient hospitalization on 1/8/20. She reports she also has had some PTSD type anxiety. She also reports panic attacks. FAMILY/SOCIAL HX: recently hospitalized    REVIEW OF SYSTEMS:  Psychiatric:  depression, anxiety, agitation, hypomania/margoth, psychosis  Appetite:good   Sleep: good   Neuro: denies    There were no vitals taken for this visit. Side Effects:  none    MENTAL STATUS EXAM:   Sensorium  oriented to time, place and person   Relations cooperative   Appearance:  age appropriate and casually dressed   Motor Behavior:  gait stable and within normal limits   Speech:  normal volume/soft, monotone   Thought Process: goal directed, tangential   Thought Content delusions and free of hallucinations   Suicidal ideations no intention   Homicidal ideations no intention   Mood:  depressed, euthymic and labile   Affect:  constricted, increased in intensity and labile   Memory recent  adequate   Memory remote:  adequate   Concentration:  impaired at times   Abstraction:  abstract   Insight:  fair and limited   Reliability fair   Judgment:  fair     MEDICAL DECISION MAKING:  Problems addressed today:    ICD-10-CM ICD-9-CM    1. Bipolar 1 disorder, mixed ([facility] F31.60 296.60    2. Anxiety F41.9 300.00     r/o PTSD       Assessment:   Sigrid Merino is responding to treatment overall.  Symptoms are stabilizing but overall pt has had difficulty with tolerating higher dosages of risperdal but limited response from lower dose. She reports while hospitalized they used some prn seroquel with benefit. Pt shared that she felt clearer on seroquel. Discussed possibility of ultimately switching seroquel for risperdal but for now will continue with current medications. Discussed importance of not increasing stress levels and ongoing rest for full stabilization. Reviewed PTSD symptoms and possibility of co-morbid condition. Reviewed PRN use of seroquel vs klonopin. Discussed lifetime management of bipolar disorder and offered healthy cognitions r/t health management. .    Plan:   1. Current Outpatient Medications   Medication Sig Dispense Refill    QUEtiapine (SEROQUEL) 25 mg tablet Take 1 Tab by mouth daily as needed (anxiety/agitation). 30 Tab 0    lithium carbonate 300 mg capsule Take 1 Cap by mouth two (2) times daily (with meals). 60 Cap 0    lithium carbonate CR (ESKALITH CR) 450 mg CR tablet Take 1 Tab by mouth daily. 30 Tab 0    risperiDONE (RISPERDAL) 2 mg tablet Take 1 Tab by mouth nightly. 30 Tab 0    clonazePAM (KLONOPIN) 1 mg tablet Take 1 Tab by mouth two (2) times daily as needed for Anxiety. Max Daily Amount: 2 mg. 45 Tab 0    PNV BQ.44/XZQYFMH fum/folic ac (PRENATAL MULTIVITAMINS PO) Take  by mouth.  clonazePAM (KLONOPIN) 0.5 mg tablet Take 1 Tab by mouth two (2) times daily as needed. Max Daily Amount: 1 mg. Do not take with other benzo's, narcotics or alcohol. Indications: Anxiety 45 Tab 1          medication changes made today: cont lithium 450 mg in am and 600 mg in pm, cont risperdal 2 mg at bedtime, cont klonopin 1 mg bid prn anxiety, re-start seroquel 25 mg prn anxiety/PTSD symptoms    2. Counseling and coordination of care including instructions for treatment, risks/benefits, risk factor reduction and patient/family education. She agrees with the plan. Patient instructed to call with any side effects, questions or issues.      3.    Follow-up and Dispositions    · Return in about 3 months (around 5/4/2020). Pt to call and request to be added to cancellation list to get in for an earlier appointment.     2/4/2020  Maria Esther Villanueva NP

## 2020-02-21 ENCOUNTER — TELEPHONE (OUTPATIENT)
Dept: BEHAVIORAL/MENTAL HEALTH CLINIC | Age: 30
End: 2020-02-21

## 2020-02-21 NOTE — TELEPHONE ENCOUNTER
Pt calls to ask about interactions with birth control, hormones, and her meds she takes. Asking for a call back. [phone]    Aware you are not in the office on [address].

## 2020-02-24 NOTE — TELEPHONE ENCOUNTER
Returned call. Discussed possible drug to drug interactions of BC and lithium. Also discussed expected timeframe for mood re-stabilization.

## 2020-02-28 RX ORDER — RISPERIDONE 2 MG/1
2 TABLET, FILM COATED ORAL
Qty: 30 TAB | Refills: 0 | Status: CANCELLED | OUTPATIENT
Start: 2020-02-28

## 2020-03-02 RX ORDER — RISPERIDONE 2 MG/1
TABLET, ORALLY DISINTEGRATING ORAL
Qty: 56 TAB | OUTPATIENT
Start: 2020-03-02

## 2020-03-02 RX ORDER — RISPERIDONE 2 MG/1
2 TABLET, FILM COATED ORAL
Qty: 30 TAB | Refills: 1 | Status: SHIPPED | OUTPATIENT
Start: 2020-03-02 | End: 2020-03-19 | Stop reason: DRUGHIGH

## 2020-03-09 ENCOUNTER — TELEPHONE (OUTPATIENT)
Dept: BEHAVIORAL/MENTAL HEALTH CLINIC | Age: 30
End: 2020-03-09

## 2020-03-09 RX ORDER — LITHIUM CARBONATE 300 MG/1
600 CAPSULE ORAL
Qty: 90 CAP | Refills: 1 | Status: SHIPPED | OUTPATIENT
Start: 2020-03-09 | End: 2020-04-30 | Stop reason: SDUPTHER

## 2020-03-09 RX ORDER — LITHIUM CARBONATE 150 MG/1
150 CAPSULE ORAL DAILY
Qty: 30 CAP | Refills: 1 | Status: SHIPPED | OUTPATIENT
Start: 2020-03-09 | End: 2020-04-30 | Stop reason: SDUPTHER

## 2020-03-09 NOTE — TELEPHONE ENCOUNTER
Pt calls to say she is not taking the 600mg tabs. .. said she is taking two 300mg at night and is taking a 150mg and 300mg in the morning.     Pt asking if this is ok and to RF?    [phone]

## 2020-03-17 ENCOUNTER — TELEPHONE (OUTPATIENT)
Dept: BEHAVIORAL/MENTAL HEALTH CLINIC | Age: 30
End: 2020-03-17

## 2020-03-17 DIAGNOSIS — F31.12 BIPOLAR 1 DISORDER, MANIC, MODERATE (HCC): ICD-10-CM

## 2020-03-17 RX ORDER — CLONAZEPAM 1 MG/1
1 TABLET ORAL
Qty: 45 TAB | Refills: 0 | Status: SHIPPED | OUTPATIENT
Start: 2020-03-17 | End: 2020-04-20 | Stop reason: SDUPTHER

## 2020-03-19 ENCOUNTER — TELEPHONE (OUTPATIENT)
Dept: BEHAVIORAL/MENTAL HEALTH CLINIC | Age: 30
End: 2020-03-19

## 2020-03-19 RX ORDER — QUETIAPINE FUMARATE 25 MG/1
25 TABLET, FILM COATED ORAL
Qty: 30 TAB | Refills: 0 | Status: SHIPPED | OUTPATIENT
Start: 2020-03-19 | End: 2020-04-30 | Stop reason: SDUPTHER

## 2020-03-19 NOTE — TELEPHONE ENCOUNTER
Called pt and she confirmed . Pt shared mood is stable but she is having SE's. Pt described symptoms of TD as well as emotional flattening. Will wean pt off of risperdal with supply she has at home for next 20 days. Will continue with lithium, prn seroquel and prn klonopin once she completes weaning.

## 2020-04-01 ENCOUNTER — TELEPHONE (OUTPATIENT)
Dept: BEHAVIORAL/MENTAL HEALTH CLINIC | Age: 30
End: 2020-04-01

## 2020-04-01 NOTE — TELEPHONE ENCOUNTER
Returned call. Pt shared that she has been dealing with increased depression and she recently experienced some SI. She has been able to connect with her therapist and her . Reviewed current medications and no changes required at this time. Reviewed strategies for safety.

## 2020-04-06 ENCOUNTER — TELEPHONE (OUTPATIENT)
Dept: BEHAVIORAL/MENTAL HEALTH CLINIC | Age: 30
End: 2020-04-06

## 2020-04-06 NOTE — TELEPHONE ENCOUNTER
Returned call. Pt has had difficulty sleeping and subsequently she noticed increased depression. Reviewed options and advised her to use seroquel nightly at this time. She remains reluctant to use use scheduled medication. Reviewed risk of not fully stabilizing bipolar symptoms by removing medication too early. Pt agrees to use nightly. Pt will call with any updates.

## 2020-04-06 NOTE — TELEPHONE ENCOUNTER
Pt leaves voicemaill Sunday morning to let provider know \"over the last two weeks she has been dealing with increased depression and has been struggling to sleep\"    (therapist, Elly Cordova, also left voicemail Friday evening [phone])

## 2020-04-20 DIAGNOSIS — F31.12 BIPOLAR 1 DISORDER, MANIC, MODERATE (HCC): ICD-10-CM

## 2020-04-20 RX ORDER — CLONAZEPAM 1 MG/1
1 TABLET ORAL
Qty: 45 TAB | Refills: 0 | Status: SHIPPED | OUTPATIENT
Start: 2020-04-20 | End: 2020-05-26 | Stop reason: SDUPTHER

## 2020-04-22 ENCOUNTER — PATIENT MESSAGE (OUTPATIENT)
Dept: BEHAVIORAL/MENTAL HEALTH CLINIC | Age: 30
End: 2020-04-22

## 2020-04-30 ENCOUNTER — VIRTUAL VISIT (OUTPATIENT)
Dept: BEHAVIORAL/MENTAL HEALTH CLINIC | Age: 30
End: 2020-04-30

## 2020-04-30 DIAGNOSIS — F31.77 BIPOLAR DISORDER, IN PARTIAL REMISSION, MOST RECENT EPISODE MIXED (HCC): ICD-10-CM

## 2020-04-30 DIAGNOSIS — F31.77 BIPOLAR DISORDER, IN PARTIAL REMISSION, MOST RECENT EPISODE MIXED (HCC): Primary | ICD-10-CM

## 2020-04-30 RX ORDER — LITHIUM CARBONATE 300 MG/1
600 CAPSULE ORAL
Qty: 90 CAP | Refills: 1 | Status: SHIPPED | OUTPATIENT
Start: 2020-04-30 | End: 2020-05-15

## 2020-04-30 RX ORDER — TEMAZEPAM 15 MG/1
15 CAPSULE ORAL
Qty: 30 CAP | Refills: 1 | Status: SHIPPED | OUTPATIENT
Start: 2020-04-30 | End: 2020-07-20

## 2020-04-30 RX ORDER — TEMAZEPAM 15 MG/1
15 CAPSULE ORAL
Qty: 30 CAP | Refills: 1 | Status: SHIPPED | OUTPATIENT
Start: 2020-04-30 | End: 2020-04-30 | Stop reason: SDUPTHER

## 2020-04-30 RX ORDER — LITHIUM CARBONATE 150 MG/1
150 CAPSULE ORAL DAILY
Qty: 30 CAP | Refills: 1 | Status: SHIPPED | OUTPATIENT
Start: 2020-04-30 | End: 2020-05-15

## 2020-04-30 RX ORDER — QUETIAPINE FUMARATE 25 MG/1
25 TABLET, FILM COATED ORAL
Qty: 30 TAB | Refills: 0 | Status: SHIPPED | OUTPATIENT
Start: 2020-04-30 | End: 2020-06-12 | Stop reason: SDUPTHER

## 2020-04-30 NOTE — PROGRESS NOTES
CHIEF COMPLAINT:  Wilmar Marsh is a 34 y.o. female and was seen today for follow-up of psychiatric condition and psychotropic medication management. HPI:    Stefania Lucas reports the following psychiatric symptoms:  depression, agitation, anxiety, margoth, hypomania and psychosis. Mood and anxiety symptoms have been present for months and are of moderate/low severity at this time. The symptoms occur less frequently and less severely with current treatment plan. Pt has had some problems with insomnia and SE's. Met with pt via video telehealth to review and update current treatment plan. FAMILY/SOCIAL HX: no changes    REVIEW OF SYSTEMS:  Psychiatric:  depression, anxiety, agitation, hypomania/margoth, psychosis  Appetite:improving   Sleep: improving but typically only 6 hours and pt has some SE's from use of seroquel that impact ability to stay asleep   Neuro: no updates      Side Effects:  RLS/akathisia    MENTAL STATUS EXAM:   Sensorium  oriented to time, place and person   Relations cooperative   Appearance:  age appropriate and casually dressed   Motor Behavior:  gait stable and within normal limits   Speech:  normal volume   Thought Process: goal directed   Thought Content free of delusions and free of hallucinations   Suicidal ideations no intention   Homicidal ideations no intention   Mood:  sad and within normal limits   Affect:  sad and wnl   Memory recent  adequate   Memory remote:  adequate   Concentration:  adequate-improving   Abstraction:  abstract   Insight:  fair and good   Reliability good and fair   Judgment:  fair and good     MEDICAL DECISION MAKING:  Problems addressed today:    ICD-10-CM ICD-9-CM    1. Bipolar disorder, in partial remission, most recent episode mixed ([facility]) F31.77 296.65 temazepam (RESTORIL) 15 mg capsule       Assessment:   Stefania Lucas is responding to treatment. Currently mood and energy symptoms are stabilizing. Pt reports she is doing well with current dose of lithium.  Latest level was done inpatient so will check at next OV. No evidence of lithium SE's or toxicity. Reviewed treatment goals and target symptoms. Will continue to work on sleep. Pt did well in past with temazepam so will re-order at this time. Pt aware of benzo safety and reviewed importance of not taking with klonopin. Discussed prn use of seroquel for sleep and prn use of risperdal for breakthrough bipolar symptoms. Reviewed treatment plan and answered questions. Plan:   1. Current Outpatient Medications   Medication Sig Dispense Refill    temazepam (RESTORIL) 15 mg capsule Take 1 Cap by mouth nightly as needed for Sleep (Do not take with other benzos, narcotics or alcohol. ). Max Daily Amount: 15 mg. 30 Cap 1    QUEtiapine (SEROquel) 25 mg tablet Take 1 Tab by mouth daily as needed (anxiety/agitation). 30 Tab 0    lithium carbonate 300 mg capsule Take 2 Caps by mouth nightly. And 1 cap in the morning. 90 Cap 1    lithium carbonate 150 mg capsule Take 1 Cap by mouth daily. Take with 300 mg cap in am for total dose of 450 mg. 30 Cap 1    clonazePAM (KlonoPIN) 1 mg tablet Take 1 Tab by mouth two (2) times daily as needed for Anxiety. Max Daily Amount: 2 mg. 45 Tab 0    lithium carbonate CR (ESKALITH CR) 450 mg CR tablet Take 1 Tab by mouth daily. 30 Tab 0    PNV IV.72/YHUZIXH fum/folic ac (PRENATAL MULTIVITAMINS PO) Take  by mouth.            medication changes made today: cont lithium, switch seroquel to prn, klonopin only for daytime anxiety prn, add temazepam prn sleep, use risperdal prn breakthrough bipolar symptoms    2. Counseling and coordination of care including instructions for treatment, risks/benefits, risk factor reduction and patient/family education. She agrees with the plan. Patient instructed to call with any side effects, questions or issues. 3.    Follow-up and Dispositions    · Return in about 3 months (around 7/30/2020).        Ana Matute is a 34 y.o. female who was seen by synchronous (real-time) audio-video technology on 4/30/2020. Consent: Olivia Chung, who was seen by synchronous (real-time) audio-video technology, and/or her healthcare decision maker, is aware that this patient-initiated, Telehealth encounter on 4/30/2020 is a billable service, with coverage as determined by her insurance carrier. She is aware that she may receive a bill and has provided verbal consent to proceed: Yes. We discussed the expected course, resolution and complications of the diagnosis(es) in detail. Medication risks, benefits, costs, interactions, and alternatives were discussed as indicated. I advised her to contact the office if her condition worsens, changes or fails to improve as anticipated. She expressed understanding with the diagnosis(es) and plan. Olivia Chung is a 34 y.o. female who was evaluated by a video visit encounter for concerns as above. Patient identification was verified prior to start of the visit. A caregiver was present when appropriate. Due to this being a TeleHealth encounter ([facility]), evaluation of the following organ systems was limited: Vitals/Constitutional/EENT/Resp/CV/GI//MS/Neuro/Skin/Heme-Lymph-Imm. Pursuant to the emergency declaration under the [address] waiver authority and the [employer] General Act, this Virtual  Visit was conducted, with patient's (and/or legal guardian's) consent, to reduce the patient's risk of exposure to COVID-19 and provide necessary medical care. Services were provided through a video synchronous discussion virtually to substitute for in-person clinic visit. Patient and provider were located at their individual homes.             4/30/2020  Corona Morrell NP

## 2020-05-07 RX ORDER — LITHIUM CARBONATE 300 MG/1
CAPSULE ORAL
Qty: 90 CAP | Refills: 1 | OUTPATIENT
Start: 2020-05-07

## 2020-05-07 RX ORDER — LITHIUM CARBONATE 150 MG/1
CAPSULE ORAL
Qty: 30 CAP | Refills: 1 | OUTPATIENT
Start: 2020-05-07

## 2020-05-15 RX ORDER — LITHIUM CARBONATE 150 MG/1
CAPSULE ORAL
Qty: 30 CAP | Refills: 1 | Status: SHIPPED | OUTPATIENT
Start: 2020-05-15 | End: 2020-09-08

## 2020-05-15 RX ORDER — LITHIUM CARBONATE 300 MG/1
CAPSULE ORAL
Qty: 90 CAP | Refills: 1 | Status: SHIPPED | OUTPATIENT
Start: 2020-05-15 | End: 2020-07-20 | Stop reason: SDUPTHER

## 2020-05-26 ENCOUNTER — TELEPHONE (OUTPATIENT)
Dept: BEHAVIORAL/MENTAL HEALTH CLINIC | Age: 30
End: 2020-05-26

## 2020-05-26 DIAGNOSIS — F31.12 BIPOLAR 1 DISORDER, MANIC, MODERATE (HCC): ICD-10-CM

## 2020-05-26 RX ORDER — CLONAZEPAM 1 MG/1
1 TABLET ORAL
Qty: 45 TAB | Refills: 0 | Status: SHIPPED | OUTPATIENT
Start: 2020-05-26 | End: 2020-06-25 | Stop reason: SDUPTHER

## 2020-05-26 NOTE — TELEPHONE ENCOUNTER
LCSW at [facility] that pt used to see called and left vm. Wanted to inform you pt is not actively in therapy with her and not scheduled. Just called as an FYI.

## 2020-06-15 RX ORDER — QUETIAPINE FUMARATE 25 MG/1
25 TABLET, FILM COATED ORAL
Qty: 30 TAB | Refills: 0 | Status: SHIPPED | OUTPATIENT
Start: 2020-06-15 | End: 2020-07-20

## 2020-06-25 DIAGNOSIS — F31.12 BIPOLAR 1 DISORDER, MANIC, MODERATE (HCC): ICD-10-CM

## 2020-06-25 RX ORDER — CLONAZEPAM 1 MG/1
1 TABLET ORAL
Qty: 45 TAB | Refills: 0 | Status: SHIPPED | OUTPATIENT
Start: 2020-06-25 | End: 2020-08-10

## 2020-07-20 ENCOUNTER — VIRTUAL VISIT (OUTPATIENT)
Dept: BEHAVIORAL/MENTAL HEALTH CLINIC | Age: 30
End: 2020-07-20

## 2020-07-20 DIAGNOSIS — F31.32 BIPOLAR 1 DISORDER, DEPRESSED, MODERATE (HCC): Primary | ICD-10-CM

## 2020-07-20 RX ORDER — LITHIUM CARBONATE 300 MG/1
CAPSULE ORAL
Qty: 90 CAP | Refills: 1 | Status: SHIPPED | OUTPATIENT
Start: 2020-07-20 | End: 2020-09-17

## 2020-07-20 RX ORDER — QUETIAPINE FUMARATE 25 MG/1
TABLET, FILM COATED ORAL
Qty: 30 TAB | Refills: 2 | Status: SHIPPED | OUTPATIENT
Start: 2020-07-20 | End: 2020-11-18

## 2020-07-20 NOTE — PROGRESS NOTES
CHIEF COMPLAINT:  Thania Pablo is a 31 y.o. female and was seen today for follow-up of psychiatric condition and psychotropic medication management. HPI:    Shaun Deluca reports the following psychiatric symptoms:  depression, anxiety, margoth and hypomania. The depression symptoms have been present for months and are of moderate severity. The symptoms occur daily at this time. Pt reports she has been dealing with fatigue which has been an issue for her. Met with pt via video telehealth for appt today. FAMILY/SOCIAL HX: stressors r/t managing a chronic mental health condition    REVIEW OF SYSTEMS:  Psychiatric:  depression, anxiety, hypomania/margoth, psychosis by hx  Appetite:good   Sleep: poor with DIMS (difficulty initiating & maintaining sleep) on avg sleeps 6 hours   Neuro: no updates      Side Effects:  none, fatigue? MENTAL STATUS EXAM:   Sensorium  oriented to time, place and person   Relations cooperative   Appearance:  age appropriate and casually dressed   Motor Behavior:  gait stable and within normal limits   Speech:  normal volume   Thought Process: goal directed   Thought Content free of delusions and free of hallucinations   Suicidal ideations no intention   Homicidal ideations no intention   Mood:  depressed   Affect:  depressed   Memory recent  adequate   Memory remote:  adequate   Concentration:  adequate   Abstraction:  abstract   Insight:  good   Reliability good   Judgment:  good     MEDICAL DECISION MAKING:  Problems addressed today:    ICD-10-CM ICD-9-CM    1. B[facility] 1 disorder, depressed, [facility]  F31.32 296.52        Assessment:   Shaun Deluca is responding to treatment overall. Currently depression symptoms are exacerbated. Pt reports she has been dealing with increased stressors. She also notes significant fatigue and is not sure if it is secondary to stressors and from medication SE. Reviewed stressors and discussed self care and healthy cognitions.  Reviewed current meds and dosing. Will check a lithium level and associated labs due to fatigue. Will dc restoril as pt did not find it effective this last time. Pt will continue with prn klonopin for sleep and or anxiety. Provided support and answered questions. Plan:   1. Current Outpatient Medications   Medication Sig Dispense Refill    clonazePAM (KlonoPIN) 1 mg tablet Take 1 Tab by mouth two (2) times daily as needed for Anxiety (30 day supply). Max Daily Amount: 2 mg. 45 Tab 0    QUEtiapine (SEROquel) 25 mg tablet Take 1 Tab by mouth daily as needed (anxiety/agitation). 30 Tab 0    lithium carbonate 300 mg capsule TAKE 2 CAPSULES BY MOUTH EVERY EVENING AND 1 CAPSULE EVERY MORNING 90 Cap 1    lithium carbonate 150 mg capsule TAKE ONE CAPSULE BY MOUTH EVERY MORNING. TAKE WITH 300 MG CAPSULE FOR TOTAL DOSE  MG. 30 Cap 1    temazepam (RESTORIL) 15 mg capsule Take 1 Cap by mouth nightly as needed for Sleep (Do not take with other benzos, narcotics or alcohol. ). Max Daily Amount: 15 mg. 30 Cap 1    lithium carbonate CR (ESKALITH CR) 450 mg CR tablet Take 1 Tab by mouth daily. 30 Tab 0    PNV VO.54/PFRKRYD fum/folic ac (PRENATAL MULTIVITAMINS PO) Take  by mouth.            medication changes made today: cont lithium, low dose seroquel and prn klonopin, dc restoril    2. Counseling and coordination of care including instructions for treatment, risks/benefits, risk factor reduction and patient/family education. She agrees with the plan. Patient instructed to call with any side effects, questions or issues. 3.    Follow-up and Dispositions    · Return in about 3 months (around 10/20/2020). Damian Patrick, who was evaluated through a synchronous (real-time) audio-video encounter, and/or her healthcare decision maker, is aware that it is a billable service, with coverage as determined by her insurance carrier. She provided verbal consent to proceed: Yes, and patient identification was verified.  It was conducted pursuant to the emergency declaration under the [facility] authority and the [employer] General Act. A caregiver was present when appropriate. Ability to conduct physical exam was limited. I was at home. The patient was at home.       7/20/2020  Jerson Jama, NP

## 2020-07-24 PROBLEM — M24.9 HYPERMOBILE JOINTS: Status: ACTIVE | Noted: 2019-01-01

## 2020-08-09 DIAGNOSIS — F31.12 BIPOLAR 1 DISORDER, MANIC, MODERATE (HCC): ICD-10-CM

## 2020-08-10 RX ORDER — CLONAZEPAM 1 MG/1
TABLET ORAL
Qty: 45 TAB | Refills: 1 | Status: SHIPPED | OUTPATIENT
Start: 2020-08-10 | End: 2020-09-30 | Stop reason: SDUPTHER

## 2020-08-15 LAB
25(OH)D3+25(OH)D2 SERPL-MCNC: 20.9 NG/ML (ref 30–100)
ALBUMIN SERPL-MCNC: 4.8 G/DL (ref 3.9–5)
ALBUMIN/GLOB SERPL: 2.3 {RATIO} (ref 1.2–2.2)
ALP SERPL-CCNC: 73 IU/L (ref 39–117)
ALT SERPL-CCNC: 9 IU/L (ref 0–32)
AST SERPL-CCNC: 17 IU/L (ref 0–40)
BILIRUB SERPL-MCNC: 0.4 MG/DL (ref 0–1.2)
BUN SERPL-MCNC: 7 MG/DL (ref 6–20)
BUN/CREAT SERPL: 8 (ref 9–23)
CALCIUM SERPL-MCNC: 9.4 MG/DL (ref 8.7–10.2)
CHLORIDE SERPL-SCNC: 104 MMOL/L (ref 96–106)
CO2 SERPL-SCNC: 20 MMOL/L (ref 20–29)
CREAT SERPL-MCNC: 0.85 MG/DL (ref 0.57–1)
ERYTHROCYTE [DISTWIDTH] IN BLOOD BY AUTOMATED COUNT: 12.3 % (ref 11.7–15.4)
GLOBULIN SER CALC-MCNC: 2.1 G/DL (ref 1.5–4.5)
GLUCOSE SERPL-MCNC: 104 MG/DL (ref 65–99)
HCT VFR BLD AUTO: 39.6 % (ref 34–46.6)
HGB BLD-MCNC: 12.8 G/DL (ref 11.1–15.9)
LITHIUM SERPL-SCNC: 0.8 MMOL/L (ref 0.6–1.2)
MCH RBC QN AUTO: 29.3 PG (ref 26.6–33)
MCHC RBC AUTO-ENTMCNC: 32.3 G/DL (ref 31.5–35.7)
MCV RBC AUTO: 91 FL (ref 79–97)
PLATELET # BLD AUTO: 260 X10E3/UL (ref 150–450)
POTASSIUM SERPL-SCNC: 4.4 MMOL/L (ref 3.5–5.2)
PROT SERPL-MCNC: 6.9 G/DL (ref 6–8.5)
RBC # BLD AUTO: 4.37 X10E6/UL (ref 3.77–5.28)
SODIUM SERPL-SCNC: 140 MMOL/L (ref 134–144)
VIT B12 SERPL-MCNC: 379 PG/ML (ref 232–1245)
WBC # BLD AUTO: 5.6 X10E3/UL (ref 3.4–10.8)

## 2020-08-19 RX ORDER — QUETIAPINE FUMARATE 25 MG/1
TABLET, FILM COATED ORAL
Qty: 30 TAB | Refills: 2 | OUTPATIENT
Start: 2020-08-19

## 2020-09-08 RX ORDER — LITHIUM CARBONATE 150 MG/1
CAPSULE ORAL
Qty: 30 CAP | Refills: 1 | Status: SHIPPED | OUTPATIENT
Start: 2020-09-08 | End: 2020-11-09

## 2020-09-14 ENCOUNTER — VIRTUAL VISIT (OUTPATIENT)
Dept: BEHAVIORAL/MENTAL HEALTH CLINIC | Age: 30
End: 2020-09-14
Payer: MEDICAID

## 2020-09-14 DIAGNOSIS — F31.32 BIPOLAR 1 DISORDER, DEPRESSED, MODERATE (HCC): Primary | ICD-10-CM

## 2020-09-14 PROCEDURE — 99214 OFFICE O/P EST MOD 30 MIN: CPT | Performed by: NURSE PRACTITIONER

## 2020-09-14 NOTE — PROGRESS NOTES
CHIEF COMPLAINT:  Nadir Child is a 31 y.o. female and was seen today for follow-up of psychiatric condition and psychotropic medication management. HPI:    Mary Lopez reports the following psychiatric symptoms:  depression, anxiety, margoth and hypomania. The depression symptoms have been present for months and are of moderate severity. The symptoms occur daily but are somewhat less severe at this time. Pt reports some side effects from medications and some ongoing fatigue. Met with pt via video telehealth for appt today. FAMILY/SOCIAL HX: family stressors    REVIEW OF SYSTEMS:  Psychiatric:  depression, anxiety, hypomania/margoth  Appetite:good   Sleep: poor with DIMS (difficulty initiating & maintaining sleep)   Neuro: RLS (se ?)      Side Effects:  none    MENTAL STATUS EXAM:   Sensorium  oriented to time, place and person   Relations cooperative   Appearance:  age appropriate and casually dressed   Motor Behavior:  gait stable and within normal limits   Speech:  normal volume   Thought Process: goal directed   Thought Content free of delusions and free of hallucinations   Suicidal ideations no intention   Homicidal ideations no intention   Mood:  sad   Affect:  sad   Memory recent  adequate   Memory remote:  adequate   Concentration:  adequate   Abstraction:  abstract   Insight:  fair and good   Reliability good and fair   Judgment:  fair and good     MEDICAL DECISION MAKING:  Problems addressed today:    ICD-10-CM ICD-9-CM    1. [facility] 1 [facility], depressed, [facility]  F31.32 296.52        Assessment:   Mary Lopez is responding to treatment overall. Pt reports a recent major depression episode. Margoth symptoms have been stable overall. Pt has done well with lithium but seems to have issues with fatigue and poor tolerability. Discussed current meds and dosing. She has not been able to tolerate SE's from seroquel so she has not been using.  She reports RLS type SE's but unclear if this is medication related. Pt will retry and monitor for SE's and benefit for depression and sleep symptoms. Discussed possibility klonopin has contributed to fatigue and depressive symptoms. Pt will monitor and if these low/moderate symptoms persist will work on med changes. Reviewed treatment goals and possible med options. Will dc seroquel at next OV and use a trial of a medication that may support depressive symptoms and mood stabilization if she is not able to tolerate seroquel. Last Lithium level was in therapeutic range at 0.8 on 8/14/20. Plan:   1. Current Outpatient Medications   Medication Sig Dispense Refill    lithium carbonate 150 mg capsule TAKE 1 CAPSULE BY MOUTH EVERY MORNING ALONG WITH 300MG 30 Cap 1    clonazePAM (KlonoPIN) 1 mg tablet TAKE 1 TABLET BY MOUTH TWICE DAILY AS NEEDED FOR ANXIETY. MAX DAILY AMOUNT: 2 MG 45 Tab 1    Sprintec, 28, 0.25-35 mg-mcg tab TK 1 T PO D      QUEtiapine (SEROquel) 25 mg tablet TAKE 1 TABLET BY MOUTH DAILY AS NEEDED FOR ANXIETY/AGITATION 30 Tab 2    lithium carbonate 300 mg capsule TAKE 2 CAPSULES BY MOUTH EVERY EVENING AND 1 CAPSULE EVERY MORNING 90 Cap 1    lithium carbonate CR (ESKALITH CR) 450 mg CR tablet Take 1 Tab by mouth daily. 30 Tab 0    PNV JK.34/YXMQMAI fum/folic ac (PRENATAL MULTIVITAMINS PO) Take  by mouth.            medication changes made today: cont lithium, hold klonopin except for panic attacks, re-try seroquel    2. Counseling and coordination of care including instructions for treatment, risks/benefits, risk factor reduction and patient/family education. She agrees with the plan. Patient instructed to call with any side effects, questions or issues. 3.    Follow-up and Dispositions    · Return in about 2 months (around 11/14/2020).        Chiqui Zarate, who was evaluated through a synchronous (real-time) audio-video encounter, and/or her healthcare decision maker, is aware that it is a billable service, with coverage as determined by her insurance carrier. She provided verbal consent to proceed: Yes, and patient identification was verified. It was conducted pursuant to the emergency declaration under the [address] [place] [address] and the [employer] General Act. A caregiver was present when appropriate. Ability to conduct physical exam was limited. I was at home. The patient was at home.       9/14/2020  Bernice Cantu NP

## 2020-09-17 RX ORDER — LITHIUM CARBONATE 300 MG/1
CAPSULE ORAL
Qty: 90 CAP | Refills: 1 | Status: SHIPPED | OUTPATIENT
Start: 2020-09-17 | End: 2020-11-18

## 2020-09-29 DIAGNOSIS — F31.12 BIPOLAR 1 DISORDER, MANIC, MODERATE (HCC): ICD-10-CM

## 2020-09-30 ENCOUNTER — TELEPHONE (OUTPATIENT)
Dept: BEHAVIORAL/MENTAL HEALTH CLINIC | Age: 30
End: 2020-09-30

## 2020-09-30 ENCOUNTER — VIRTUAL VISIT (OUTPATIENT)
Dept: BEHAVIORAL/MENTAL HEALTH CLINIC | Age: 30
End: 2020-09-30
Payer: MEDICAID

## 2020-09-30 DIAGNOSIS — F31.77 BIPOLAR DISORDER, IN PARTIAL REMISSION, MOST RECENT EPISODE MIXED (HCC): Primary | ICD-10-CM

## 2020-09-30 PROCEDURE — 99213 OFFICE O/P EST LOW 20 MIN: CPT | Performed by: NURSE PRACTITIONER

## 2020-09-30 RX ORDER — CLONAZEPAM 1 MG/1
TABLET ORAL
Qty: 45 TAB | Refills: 1 | Status: SHIPPED | OUTPATIENT
Start: 2020-09-30 | End: 2021-01-05

## 2020-09-30 RX ORDER — RISPERIDONE 1 MG/1
1 TABLET, FILM COATED ORAL
Qty: 30 TAB | Refills: 1 | Status: SHIPPED | OUTPATIENT
Start: 2020-09-30 | End: 2020-09-30 | Stop reason: SDUPTHER

## 2020-09-30 RX ORDER — CLONAZEPAM 1 MG/1
TABLET ORAL
Qty: 45 TAB | OUTPATIENT
Start: 2020-09-30

## 2020-09-30 RX ORDER — RISPERIDONE 1 MG/1
1 TABLET, FILM COATED ORAL
Qty: 30 TAB | Refills: 1 | Status: SHIPPED | OUTPATIENT
Start: 2020-09-30 | End: 2020-11-30

## 2020-09-30 NOTE — TELEPHONE ENCOUNTER
Patient left a voicemail stating she is having difficulty sleeping, and this is causing her stress. She mentions she is leaving for a trip soon. She inquires what the plan is to get her back on schedule with sleep.   # [phone]

## 2020-09-30 NOTE — PROGRESS NOTES
CHIEF COMPLAINT:  Alis Dyer is a 31 y.o. female and was seen today for follow-up of psychiatric condition and psychotropic medication management. HPI:    Kim Menchaca reports the following psychiatric symptoms:  depression, agitation, anxiety, margoth, hypomania and psychosis. Recently she experienced insomnia and increased stress followed by some depression and delusions. The symptoms have been present for a few days. Pt reports she used prn risperdal and klonopin instead of seroquel. Pt requested appt to review current treatment plan and to stabilize symptoms. Met with pt via video telehealth for appt today. REVIEW OF SYSTEMS:  Psychiatric:  depression, anxiety, agitation, hypomania/margoth, psychosis  Appetite:no change from normal   Sleep: poor with DIMS (difficulty initiating & maintaining sleep)     Side Effects:  none-mild somnolence from risperdal    MENTAL STATUS EXAM:   Sensorium  oriented to time, place and person   Relations cooperative   Appearance:  age appropriate and casually dressed   Motor Behavior:  gait stable and within normal limits   Speech:  normal volume   Thought Process: goal directed   Thought Content free of delusions and free of hallucinations   Suicidal ideations no intention   Homicidal ideations no intention   Mood:  Depressed/sad, anxious   Affect:  anxious and depressed   Memory recent  adequate   Memory remote:  adequate   Concentration:  adequate   Abstraction:  abstract   Insight:  good   Reliability good   Judgment:  good     MEDICAL DECISION MAKING:  Problems addressed today:    ICD-10-CM ICD-9-CM    1. Bipolar disorder, in partial remission, most recent episode mixed ([facility])  F31.77 296.65 clonazePAM (KlonoPIN) 1 mg tablet      risperiDONE (RisperDAL) 1 mg tablet       Assessment:   Kim Menchaca is responding to treatment overall. Pt reports she had missed am dose of lithium for almost 1 week.  She has had increased stress and insomnia and she started to notice mood shift to more depressed and some delusions. She tried klonopin prn for sleep and did not benefit so she retried risperdal. Had previously dc'd it as pt preferred prn use of seroquel as it did not leave her as sedated. Reviewed meds and dosing. Will cont with lithium and restart PRN risperdal as pt finds it effective for breakthrough symptoms. Will cont prn klonopin. Pt uses as directed and is aware of safety guidelines. Will dc seroquel at this time. Reviewed risk factors for bipolar activation. Pt plans to focus on rest and sleep for next few days. Plan:   1. Current Outpatient Medications   Medication Sig Dispense Refill    clonazePAM (KlonoPIN) 1 mg tablet TAKE 1 TABLET BY MOUTH TWICE DAILY AS NEEDED FOR ANXIETY. MAX DAILY AMOUNT: 2 MG 45 Tab 1    risperiDONE (RisperDAL) 1 mg tablet Take 1 Tab by mouth nightly as needed (breakthrough symptoms). 30 Tab 1    lithium carbonate 300 mg capsule TAKE 2 CAPSULES BY MOUTH EVERY EVENING AND 1 EVERY MORNING 90 Cap 1    lithium carbonate 150 mg capsule TAKE 1 CAPSULE BY MOUTH EVERY MORNING ALONG WITH 300MG 30 Cap 1    Sprintec, 28, 0.25-35 mg-mcg tab TK 1 T PO D      QUEtiapine (SEROquel) 25 mg tablet TAKE 1 TABLET BY MOUTH DAILY AS NEEDED FOR ANXIETY/AGITATION 30 Tab 2    lithium carbonate CR (ESKALITH CR) 450 mg CR tablet Take 1 Tab by mouth daily. 30 Tab 0    PNV KV.45/OVVFNQF fum/folic ac (PRENATAL MULTIVITAMINS PO) Take  by mouth.            medication changes: cont lithium, re-start prn risperdal 1 mg, cont klonopin prn     2. Counseling and coordination of care including instructions for treatment, risks/benefits, risk factor reduction and patient/family education. She agrees with the plan. Patient instructed to call with any side effects, questions or issues. 3.    Follow-up and Dispositions    · Return in about 4 weeks (around 10/28/2020).        Mannie Amaya, who was evaluated through a synchronous (real-time) audio-video encounter, and/or her healthcare decision maker, is aware that it is a billable service, with coverage as determined by her insurance carrier. She provided verbal consent to proceed: Yes, and patient identification was verified. It was conducted pursuant to the emergency declaration under the [address] [place] [address], [address] [place] [address] authority and the [employer] General Act. A caregiver was present when appropriate. Ability to conduct physical exam was limited. I was at home. The patient was at home.             Ginna Lobato NP  9/30/2020

## 2020-10-28 ENCOUNTER — VIRTUAL VISIT (OUTPATIENT)
Dept: BEHAVIORAL/MENTAL HEALTH CLINIC | Age: 30
End: 2020-10-28
Payer: MEDICAID

## 2020-10-28 DIAGNOSIS — F41.9 ANXIETY: ICD-10-CM

## 2020-10-28 DIAGNOSIS — F31.60 BIPOLAR 1 DISORDER, MIXED (HCC): Primary | ICD-10-CM

## 2020-10-28 PROCEDURE — 99214 OFFICE O/P EST MOD 30 MIN: CPT | Performed by: NURSE PRACTITIONER

## 2020-10-28 NOTE — PROGRESS NOTES
CHIEF COMPLAINT:  Farheen Zayas is a 31 y.o. female and was seen today for follow-up of psychiatric condition and psychotropic medication management. HPI:    Alden Ibarra reports the following psychiatric symptoms:  depression, agitation, anxiety, margoth, hypomania and psychosis. The mood symptoms have been present for months and are of moderate/high severity. She states she has had some episodes of altered perceptions/delusions. She states she was experiencing hypomania/margoth. She also reports some associated psychosis. The symptoms occur intermittently and are moderate to high severity. Met with pt via video telehealth for appt today. FAMILY/SOCIAL HX: stress r/t husbands health,     REVIEW OF SYSTEMS:  Psychiatric:  depression, anxiety, hypomania/margoth, psychosis  Appetite:good/fair   Sleep: improved overall  Neuro: no updates      Side Effects:  none    MENTAL STATUS EXAM:   Sensorium  oriented to time, place and person   Relations cooperative   Appearance:  age appropriate and casually dressed   Motor Behavior:  gait stable and within normal limits   Speech:  normal volume   Thought Process: goal directed   Thought Content delusions (clearing) and free of hallucinations   Suicidal ideations no intention   Homicidal ideations no intention   Mood:  depressed   Affect:  depressed   Memory recent  adequate   Memory remote:  adequate   Concentration:  adequate   Abstraction:  abstract   Insight:  fair and good   Reliability good and fair   Judgment:  fair and good     MEDICAL DECISION MAKING:  Problems addressed today:    ICD-10-CM ICD-9-CM    1. B[facility] 1 [facility], [facility]  F31.60 296.60    2. Anxiety  F41.9 300.00        Assessment:   Alden Ibarra is responding to treatment overall. She reports she stopped risperdal and expereinced some margoth and psychosis. Reviewed pt's questions about bipolar disorder.  Pt states she thinks symptoms of depression got worse when re-started risperdal. Discussed experience of mood stabilization and discussed frequent reports of depression following margoth. Reviewed current medications and no changes made. Reinforced importance of taking meds consistently. Pt's last lithium level was 0.8, in therapeutic range, on 7/22/20. Reviewed use of benzo and prn guidelines. Discussed importance of ind therapy and will send resources. Plan:   1. Current Outpatient Medications   Medication Sig Dispense Refill    clonazePAM (KlonoPIN) 1 mg tablet TAKE 1 TABLET BY MOUTH TWICE DAILY AS NEEDED FOR ANXIETY. MAX DAILY AMOUNT: 2 MG 45 Tab 1    risperiDONE (RisperDAL) 1 mg tablet Take 1 Tab by mouth nightly as needed (breakthrough symptoms). 30 Tab 1    lithium carbonate 300 mg capsule TAKE 2 CAPSULES BY MOUTH EVERY EVENING AND 1 EVERY MORNING 90 Cap 1    lithium carbonate 150 mg capsule TAKE 1 CAPSULE BY MOUTH EVERY MORNING ALONG WITH 300MG 30 Cap 1    Sprintec, 28, 0.25-35 mg-mcg tab TK 1 T PO D      QUEtiapine (SEROquel) 25 mg tablet TAKE 1 TABLET BY MOUTH DAILY AS NEEDED FOR ANXIETY/AGITATION 30 Tab 2    lithium carbonate CR (ESKALITH CR) 450 mg CR tablet Take 1 Tab by mouth daily. 30 Tab 0    PNV SQ.33/BCUSSIU fum/folic ac (PRENATAL MULTIVITAMINS PO) Take  by mouth.            medication changes made today: cont lithium, risperdal and klonopin prn    2. Counseling and coordination of care including instructions for treatment, risks/benefits, risk factor reduction and patient/family education. She agrees with the plan. Patient instructed to call with any side effects, questions or issues. 3.    Follow-up and Dispositions    · Return in about 3 months (around 1/28/2021). Ann Fowler, who was evaluated through a synchronous (real-time) audio-video encounter, and/or her healthcare decision maker, is aware that it is a billable service, with coverage as determined by her insurance carrier. She provided verbal consent to proceed: Yes, and patient identification was verified.  It was conducted pursuant to the emergency declaration under the [facility], [address] [place] [address] authority and the [employer] and [employer] General Act. A caregiver was present when appropriate. Ability to conduct physical exam was limited. I was at home. The patient was at home.       10/28/2020  Magalis Luna NP

## 2020-11-09 RX ORDER — LITHIUM CARBONATE 150 MG/1
CAPSULE ORAL
Qty: 30 CAP | Refills: 1 | Status: SHIPPED | OUTPATIENT
Start: 2020-11-09 | End: 2021-01-25

## 2020-11-18 RX ORDER — LITHIUM CARBONATE 300 MG/1
CAPSULE ORAL
Qty: 90 CAP | Refills: 2 | Status: SHIPPED | OUTPATIENT
Start: 2020-11-18 | End: 2021-02-24

## 2020-11-18 RX ORDER — QUETIAPINE FUMARATE 25 MG/1
TABLET, FILM COATED ORAL
Qty: 30 TAB | Refills: 2 | Status: SHIPPED | OUTPATIENT
Start: 2020-11-18 | End: 2020-12-01 | Stop reason: SDUPTHER

## 2020-11-30 ENCOUNTER — TELEPHONE (OUTPATIENT)
Dept: BEHAVIORAL/MENTAL HEALTH CLINIC | Age: 30
End: 2020-11-30

## 2020-11-30 DIAGNOSIS — F31.77 BIPOLAR DISORDER, IN PARTIAL REMISSION, MOST RECENT EPISODE MIXED (HCC): ICD-10-CM

## 2020-11-30 RX ORDER — RISPERIDONE 1 MG/1
TABLET, FILM COATED ORAL
Qty: 30 TAB | Refills: 1 | Status: SHIPPED | OUTPATIENT
Start: 2020-11-30 | End: 2020-12-03

## 2020-11-30 NOTE — TELEPHONE ENCOUNTER
Patient is calling to request prescription for antibiotic before his dental procedure scheduled for 06/21/17. Please send prescription to pharmacy listed below. Please advise        [facility] [address] [place] [address] [address] AT [employer] [facility]  [address]  [facility] [address]  Phone: [phone] Fax: [phone]       Patient reports having a rough weekend. On Saturday, patient says she had suicidal thoughts, felt really low, depressed, bad visions throughout the day, low energy, and tired. Her brother-in-law came to visit (he is in the healthcare field) and suggested she reach out to her provider. She is trying to stay on top of this to keep from having to be hospitalized. She says she scheduled a therapist appt this week and  to get a break. Her next appt here is 1/7/2021. She requests a phone call, not necessarily an appointment, since she feels she does not need a medication change. Please advise.

## 2020-11-30 NOTE — TELEPHONE ENCOUNTER
Noted. Have discussed with Lennox Meraz RN, [facility] Coordinator. Will work to get pt's appt moved up to this week.

## 2020-11-30 NOTE — TELEPHONE ENCOUNTER
Contacted pt by phone to discuss current concerns. Pt states that over the weekend she had a significant depressive episode, and on Saturday was having suicidal ideations with a plan. She did reach out to family, and her  was able to help her over the weekend. He knows to be keeping an eye on her, and she has arranged for a therapist appointment on Wednesday, and has lined up some childcare this week to help reduce stressors. She denies any suicidal ideations currently. Discussed with patient different levels of suicidal ideations, specifically differences between thoughts, plans, and capabilities to carry out plans. Discussed the importance of reaching out for additional help, via suicide hotline, or by going to the emergency department if the thoughts return or escalate. Pt expressed understanding. She has a solid safety plan in place currently with her  and therapist, and knows who to reach out to overnight if symptoms worsen. Advised pt that her provider will be updated of the situation in the morning, and we will add the patient to cancellation list to get her in as soon as possible to discuss further with Verena Councilman. Advised pt to please call office with any further questions or concerns, and reiterated the importance of going to the emergency room if intrusive thoughts return. Pt in agreement.       Rica Connolly RN

## 2020-12-01 ENCOUNTER — VIRTUAL VISIT (OUTPATIENT)
Dept: BEHAVIORAL/MENTAL HEALTH CLINIC | Age: 30
End: 2020-12-01
Payer: MEDICAID

## 2020-12-01 DIAGNOSIS — F31.77 BIPOLAR DISORDER, IN PARTIAL REMISSION, MOST RECENT EPISODE MIXED (HCC): Primary | ICD-10-CM

## 2020-12-01 DIAGNOSIS — F41.9 ANXIETY: ICD-10-CM

## 2020-12-01 PROCEDURE — 99214 OFFICE O/P EST MOD 30 MIN: CPT | Performed by: NURSE PRACTITIONER

## 2020-12-01 RX ORDER — QUETIAPINE FUMARATE 25 MG/1
50 TABLET, FILM COATED ORAL
Qty: 60 TAB | Refills: 0
Start: 2020-12-01 | End: 2021-01-07

## 2020-12-01 NOTE — PROGRESS NOTES
CHIEF COMPLAINT:  Jakob Durán is a 31 y.o. female and was seen today for follow-up of psychiatric condition and psychotropic medication management. HPI:    Cally Eldridge reports the following psychiatric symptoms by hx:  depression, agitation, anxiety, margoth and hypomania. Overall symptoms have been present for last few weeks. Currently mood is depressed and thoughts are active and racing. Pt has had some delusional thoughts. Symptoms are of moderate/high to high severity. The symptoms occur daily at this time. Pt reports medications are somewhat helpful. Met with pt via video telehelath for appt today. FAMILY/SOCIAL HX: supportive family    REVIEW OF SYSTEMS:  Psychiatric:  depression, anxiety, agitation, hypomania/margoth  Appetite:decreased and good   Sleep: fitful, decreased sleep over past few weeks   Neuro: no updates    Side Effects:  none    MENTAL STATUS EXAM:   Sensorium  oriented to time, place and person   Relations cooperative   Appearance:  age appropriate and casually dressed   Motor Behavior:  gait stable and within normal limits   Speech:  normal volume   Thought Process: goal directed   Thought Content free of delusions and free of hallucinations   Suicidal ideations no intention   Homicidal ideations no intention   Mood:  depressed   Affect:  depressed   Memory recent  adequate   Memory remote:  adequate   Concentration:  adequate   Abstraction:  abstract   Insight:  fair   Reliability fair   Judgment:  fair     MEDICAL DECISION MAKING:  Problems addressed today:    ICD-10-CM ICD-9-CM    1. Bipolar disorder, in partial remission, most recent episode mixed ([facility])  F31.77 296.65    2. Anxiety  F41.9 300.00        Assessment:   Cally Eldridge is responding to treatment somewhat. She has had several significant stressors over the past few months. Symptoms are exacerbated and pt with mixed presentation. Pt recently was experiencing delusions and SI.  She was able to work with her sister and brother in law and had a good safety plan. She spoke yesterday with Vee Galicia RN, [facility] Coordinator, and he worked with pt to reinforce safety plan. Discussed current medications and dosages. Will work to switch antipsychotic from risperdal to seroquel which may be more effective for mixed episodes and bipolar depression. Discussed importance of holding klonopin for now due to potential for disinhibition. Reviewed treatment goals and target symptoms to monitor for. Provided support and reinforced safety. Plan:   1. Current Outpatient Medications   Medication Sig Dispense Refill    QUEtiapine (SEROquel) 25 mg tablet Take 2 Tabs by mouth nightly. 60 Tab 0    risperiDONE (RisperDAL) 1 mg tablet TAKE 1 TABLET BY MOUTH AT BEDTIME AS NEEDED FOR BREAKTHROUGH SYMPTOMS 30 Tab 1    lithium carbonate 300 mg capsule TAKE 2 CAPSULES BY MOUTH EVERY EVENING AND 1 EVERY MORNING 90 Cap 2    lithium carbonate 150 mg capsule TAKE ONE CAPSULE BY MOUTH EVERY MORNING ALONG WITH 300 MG 30 Cap 1    clonazePAM (KlonoPIN) 1 mg tablet TAKE 1 TABLET BY MOUTH TWICE DAILY AS NEEDED FOR ANXIETY. MAX DAILY AMOUNT: 2 MG 45 Tab 1    Sprintec, 28, 0.25-35 mg-mcg tab TK 1 T PO D      lithium carbonate CR (ESKALITH CR) 450 mg CR tablet Take 1 Tab by mouth daily. 30 Tab 0    PNV EW.24/SMZDVSR fum/folic ac (PRENATAL MULTIVITAMINS PO) Take  by mouth.            medication changes made today: cont lithium, cont risperdal, inc seroquel 50 mg, hold klonopin    2. Counseling and coordination of care including instructions for treatment, risks/benefits, risk factor reduction and patient/family education. She agrees with the plan. Patient instructed to call with any side effects, questions or issues. 3.    Follow-up and Dispositions    · Return in about 4 weeks (around 12/29/2020).        Rocio Cutler, who was evaluated through a synchronous (real-time) audio-video encounter, and/or her healthcare decision maker, is aware that it is a billable service, with coverage as determined by her insurance carrier. She provided verbal consent to proceed: Yes, and patient identification was verified. It was conducted pursuant to the emergency declaration under the [address] [place] [address] authority and the [employer] General Act. A caregiver was present when appropriate. Ability to conduct physical exam was limited. I was in the office. The patient was at home.       12/1/2020  Vinay Cardona NP

## 2020-12-03 ENCOUNTER — TELEPHONE (OUTPATIENT)
Dept: BEHAVIORAL/MENTAL HEALTH CLINIC | Age: 30
End: 2020-12-03

## 2020-12-03 DIAGNOSIS — F31.77 BIPOLAR DISORDER, IN PARTIAL REMISSION, MOST RECENT EPISODE MIXED (HCC): ICD-10-CM

## 2020-12-03 RX ORDER — RISPERIDONE 0.5 MG/1
0.5 TABLET, FILM COATED ORAL
Qty: 15 TAB | Refills: 0 | Status: SHIPPED | OUTPATIENT
Start: 2020-12-03 | End: 2021-01-07

## 2020-12-03 RX ORDER — RISPERIDONE 1 MG/1
0.5 TABLET, FILM COATED ORAL
Qty: 30 TAB | Refills: 1
Start: 2020-12-03 | End: 2020-12-03 | Stop reason: SDUPTHER

## 2020-12-03 NOTE — TELEPHONE ENCOUNTER
Returned call. Pt is tolerating seroquel well at this time. Will decrease risperdal at this time to 0.5 mg and work to wean off.

## 2020-12-03 NOTE — TELEPHONE ENCOUNTER
Patient is calling to give an update. She had a vv on 12/1 and was started on seroquel. Patient reports some dizziness and is tired but doing a lot better than she was on Saturday. Patient asks what provider wants to do about the dosage, increase or stay the same?

## 2020-12-15 DIAGNOSIS — F31.77 BIPOLAR DISORDER, IN PARTIAL REMISSION, MOST RECENT EPISODE MIXED (HCC): ICD-10-CM

## 2020-12-15 RX ORDER — RISPERIDONE 0.5 MG/1
TABLET, FILM COATED ORAL
Qty: 15 TAB | Refills: 0 | OUTPATIENT
Start: 2020-12-15

## 2020-12-18 ENCOUNTER — HOSPITAL ENCOUNTER (OUTPATIENT)
Dept: GENERAL RADIOLOGY | Age: 30
Discharge: HOME | End: 2020-12-18
Attending: INTERNAL MEDICINE
Payer: MEDICAID

## 2020-12-18 DIAGNOSIS — R68.84 PAIN IN LOWER JAW: ICD-10-CM

## 2020-12-18 PROCEDURE — 70360 X-RAY EXAM OF NECK: CPT

## 2021-01-05 DIAGNOSIS — F31.77 BIPOLAR DISORDER, IN PARTIAL REMISSION, MOST RECENT EPISODE MIXED (HCC): ICD-10-CM

## 2021-01-05 RX ORDER — CLONAZEPAM 1 MG/1
TABLET ORAL
Qty: 45 TAB | Refills: 0 | Status: SHIPPED | OUTPATIENT
Start: 2021-01-05 | End: 2021-02-01

## 2021-01-05 NOTE — TELEPHONE ENCOUNTER
Reyna Daniels-    OK to fill. Last fill was 11/19/2020. Appt with you is in 2 days.     Thanks-    Randell Miller

## 2021-01-07 ENCOUNTER — DOCUMENTATION ONLY (OUTPATIENT)
Dept: BEHAVIORAL/MENTAL HEALTH CLINIC | Age: 31
End: 2021-01-07

## 2021-01-07 ENCOUNTER — VIRTUAL VISIT (OUTPATIENT)
Dept: BEHAVIORAL/MENTAL HEALTH CLINIC | Age: 31
End: 2021-01-07
Payer: MEDICAID

## 2021-01-07 DIAGNOSIS — F31.77 BIPOLAR DISORDER, IN PARTIAL REMISSION, MOST RECENT EPISODE MIXED (HCC): Primary | ICD-10-CM

## 2021-01-07 DIAGNOSIS — F41.9 ANXIETY: ICD-10-CM

## 2021-01-07 PROCEDURE — 99214 OFFICE O/P EST MOD 30 MIN: CPT | Performed by: NURSE PRACTITIONER

## 2021-01-07 RX ORDER — QUETIAPINE FUMARATE 50 MG/1
50 TABLET, FILM COATED ORAL
Qty: 30 TAB | Refills: 0
Start: 2021-01-07 | End: 2021-03-18

## 2021-01-07 NOTE — PROGRESS NOTES
CHIEF COMPLAINT:  Neville Barbosa is a 31 y.o. female and was seen today for follow-up of psychiatric condition and psychotropic medication management. HPI:    Charis Garcia reports the following psychiatric symptoms by hx:  depression, anxiety, margoth and hypomania. Overall mood symptoms have been present for months. Currently mood is stabilizing and pt states she feels this is the first time in a long time she feels at baseline. Anxiety symptoms are of moderate severity. Both mood and anxiety symptoms occur less frequently and less severely. Pt reports medications are beneficial. She states med change has been positive. Met with pt via video telehelath for appt today. FAMILY/SOCIAL HX: no new stressors    REVIEW OF SYSTEMS:  Psychiatric:  depression, anxiety, hypomania/margoth  Appetite:good   Sleep: improved, still using klonopin a few times per week   Neuro: no updates      Side Effects:  none    MENTAL STATUS EXAM:   Sensorium  oriented to time, place and person   Relations cooperative   Appearance:  age appropriate and casually dressed   Motor Behavior:  gait stable and within normal limits   Speech:  normal volume   Thought Process: goal directed   Thought Content free of delusions and free of hallucinations   Suicidal ideations no intention   Homicidal ideations no intention   Mood:  within normal limits   Affect:  wnl   Memory recent  adequate   Memory remote:  adequate   Concentration:  adequate   Abstraction:  abstract   Insight:  good   Reliability good   Judgment:  good     MEDICAL DECISION MAKING:  Problems addressed today:    ICD-10-CM ICD-9-CM    1. Bipolar disorder, in partial remission, most recent episode mixed ([facility])  F31.77 296.65 LITHIUM   2. Anxiety  F41.9 300.00        Assessment:   Charis Garcia is responding to treatment. Symptoms are stabilizing. Pt reports she feels better with seroquel vs risperdal. Both mood and anxiety symptoms are stabilizing. Discussed current medications and dosages.  No changes required. Will check a lithium level within the next week or 2. Pt is using klonopin with benefit. She typically uses a 1/2 tab a few times per week. Pt aware of benzo safety. Reviewed treatment goals and target symptoms to monitor for. Plan:   1. Current Outpatient Medications   Medication Sig Dispense Refill    QUEtiapine (SEROquel) 50 mg tablet Take 1 Tab by mouth nightly. 30 Tab 0    clonazePAM (KlonoPIN) 1 mg tablet TAKE 1 TABLET BY MOUTH TWICE DAILY AS NEEDED FOR ANXIETY. MAX DAILY AMOUNT: 2 MG 45 Tab 0    lithium carbonate 300 mg capsule TAKE 2 CAPSULES BY MOUTH EVERY EVENING AND 1 EVERY MORNING 90 Cap 2    lithium carbonate 150 mg capsule TAKE ONE CAPSULE BY MOUTH EVERY MORNING ALONG WITH 300 MG 30 Cap 1    Sprintec, 28, 0.25-35 mg-mcg tab TK 1 T PO D      PNV NM.51/PUWCSJM fum/folic ac (PRENATAL MULTIVITAMINS PO) Take  by mouth.            medication changes made today: cont lithium, seroquel and prn klonopin    2. Counseling and coordination of care including instructions for treatment, risks/benefits, risk factor reduction and patient/family education. She agrees with the plan. Patient instructed to call with any side effects, questions or issues. 3.    Follow-up and Dispositions    · Return in about 3 months (around 4/7/2021). Reina Patiño, who was evaluated through a synchronous (real-time) audio-video encounter, and/or her healthcare decision maker, is aware that it is a billable service, with coverage as determined by her insurance carrier. She provided verbal consent to proceed: Yes, and patient identification was verified. It was conducted pursuant to the emergency declaration under the [facility],  waiver authority and the [employer] General Act. A caregiver was present when appropriate. Ability to conduct physical exam was limited. I was at home.  The patient was at home.        1/7/2021  Geoffrey Yuan NP

## 2021-01-25 RX ORDER — LITHIUM CARBONATE 150 MG/1
CAPSULE ORAL
Qty: 30 CAP | Refills: 2 | Status: SHIPPED | OUTPATIENT
Start: 2021-01-25 | End: 2021-04-01 | Stop reason: SDUPTHER

## 2021-01-29 DIAGNOSIS — F31.77 BIPOLAR DISORDER, IN PARTIAL REMISSION, MOST RECENT EPISODE MIXED (HCC): ICD-10-CM

## 2021-02-01 RX ORDER — CLONAZEPAM 1 MG/1
TABLET ORAL
Qty: 45 TAB | Refills: 0 | Status: SHIPPED | OUTPATIENT
Start: 2021-02-01 | End: 2021-03-18

## 2021-02-24 ENCOUNTER — PATIENT MESSAGE (OUTPATIENT)
Dept: BEHAVIORAL/MENTAL HEALTH CLINIC | Age: 31
End: 2021-02-24

## 2021-02-24 RX ORDER — LITHIUM CARBONATE 300 MG/1
CAPSULE ORAL
Qty: 90 CAP | Refills: 2 | Status: SHIPPED | OUTPATIENT
Start: 2021-02-24 | End: 2021-06-02 | Stop reason: SDUPTHER

## 2021-02-24 NOTE — TELEPHONE ENCOUNTER
Left message for patient to return call in reference to if she received Lithium lab orders from January 2021.

## 2021-02-27 LAB — LITHIUM SERPL-SCNC: 0.8 MMOL/L (ref 0.6–1.2)

## 2021-03-18 DIAGNOSIS — F31.77 BIPOLAR DISORDER, IN PARTIAL REMISSION, MOST RECENT EPISODE MIXED (HCC): ICD-10-CM

## 2021-03-18 RX ORDER — QUETIAPINE FUMARATE 50 MG/1
TABLET, FILM COATED ORAL
Qty: 30 TAB | Refills: 0 | Status: SHIPPED | OUTPATIENT
Start: 2021-03-18 | End: 2021-04-01 | Stop reason: SDUPTHER

## 2021-03-18 RX ORDER — CLONAZEPAM 1 MG/1
TABLET ORAL
Qty: 45 TAB | Refills: 0 | Status: SHIPPED | OUTPATIENT
Start: 2021-03-18 | End: 2021-04-01 | Stop reason: SDUPTHER

## 2021-03-18 NOTE — TELEPHONE ENCOUNTER
Requested Prescriptions     Pending Prescriptions Disp Refills    clonazePAM (KlonoPIN) 1 mg tablet [Pharmacy Med Name: CLONAZEPAM 1MG TABLETS] 45 Tab      Sig: TAKE 1 TABLET BY MOUTH TWICE DAILY AS NEEDED FOR ANXIETY.  MAX DAILY AMOUNT: 2 MG     Last visit: 01.07.21  Next visit: 04.01.21    :  02/02/2021 3 02/02/2021 Hydrocodone-Acetamin 5-325 Mg10.00 2 Galina Pen [id number] Renetta (2000) 0 25.00 [facility] [place]     02/01/2021 3 02/01/2021 Clonazepam 1 Mg Tablet 45.00 22 Pa All [id number] Renetta (2000) 0 4.09 [place]     01/05/2021 3 01/05/2021 Clonazepam 1 Mg [place]77.00 22 Pa All [id number] Renetta (2000) 0 4.09 [place]     11/19/2020 3 09/30/2020 Clonazepam 1 Mg Tablet 45.00 22 Pa All [id number] Renetta (2000) 1 4.09 [place]

## 2021-04-01 ENCOUNTER — VIRTUAL VISIT (OUTPATIENT)
Dept: BEHAVIORAL/MENTAL HEALTH CLINIC | Age: 31
End: 2021-04-01
Payer: MEDICAID

## 2021-04-01 DIAGNOSIS — F41.9 ANXIETY: ICD-10-CM

## 2021-04-01 DIAGNOSIS — F31.77 BIPOLAR DISORDER, IN PARTIAL REMISSION, MOST RECENT EPISODE MIXED (HCC): Primary | ICD-10-CM

## 2021-04-01 PROCEDURE — 99214 OFFICE O/P EST MOD 30 MIN: CPT | Performed by: NURSE PRACTITIONER

## 2021-04-01 RX ORDER — CLONAZEPAM 1 MG/1
1 TABLET ORAL
Qty: 30 TAB | Refills: 0 | Status: SHIPPED | OUTPATIENT
Start: 2021-04-16 | End: 2021-08-12 | Stop reason: SDUPTHER

## 2021-04-01 RX ORDER — QUETIAPINE FUMARATE 50 MG/1
TABLET, FILM COATED ORAL
Qty: 30 TAB | Refills: 2 | Status: SHIPPED | OUTPATIENT
Start: 2021-04-01 | End: 2021-06-02 | Stop reason: SDUPTHER

## 2021-04-01 RX ORDER — LITHIUM CARBONATE 150 MG/1
CAPSULE ORAL
Qty: 30 CAP | Refills: 2 | Status: SHIPPED | OUTPATIENT
Start: 2021-04-01 | End: 2021-06-02 | Stop reason: DRUGHIGH

## 2021-04-01 NOTE — PROGRESS NOTES
CHIEF COMPLAINT:  Cece Patrick is a 31 y.o. female and was seen today for follow-up of psychiatric condition and psychotropic medication management. HPI:    Kelley Barrow reports the following psychiatric symptoms by hx:  depression and anxiety. Overall symptoms have been present for months. Currently symptoms are of moderate/high severity. The symptoms occur less frequently and less severely. Pt reports medications are beneficial overall. Met with pt via video telehealth for appt today. FAMILY/SOCIAL HX: psychosocial stressors    REVIEW OF SYSTEMS:  Psychiatric:  depression, anxiety, agitation, hypomania/margoth  Appetite:good   Sleep: good, some problems with maintaining sleep  Neuro: no updates      Side Effects:  none    MENTAL STATUS EXAM:   Sensorium  oriented to time, place and person   Relations cooperative   Appearance:  age appropriate and casually dressed   Motor Behavior:  gait stable and within normal limits   Speech:  normal volume   Thought Process: goal directed   Thought Content free of delusions and free of hallucinations   Suicidal ideations no intention   Homicidal ideations no intention   Mood:  anxious   Affect:  anxious   Memory recent  adequate   Memory remote:  adequate   Concentration:  adequate   Abstraction:  abstract   Insight:  good   Reliability good   Judgment:  good     MEDICAL DECISION MAKING:  Problems addressed today:    ICD-10-CM ICD-9-CM    1. Bipolar disorder, in partial remission, most recent episode mixed (HCC)  F31.77 296.65    2. Anxiety  F41.9 300.00        Assessment:   Kelley Barrow is responding to treatment. Symptoms are stable at this time. Discussed current medications and dosages. No changes required at time. Discussed options for medications during pregnancy. Will work first to wean off of klonopin. Reviewed treatment goals and target symptoms to monitor for. Plan:   1.     Current Outpatient Medications   Medication Sig Dispense Refill    QUEtiapine (SEROquel) 50 mg tablet TAKE 1 TABLET BY MOUTH EVERY NIGHT AT BEDTIME 30 Tab 0    clonazePAM (KlonoPIN) 1 mg tablet TAKE 1 TABLET BY MOUTH TWICE DAILY AS NEEDED FOR ANXIETY. MAX DAILY AMOUNT: 2 MG 45 Tab 0    lithium carbonate 300 mg capsule TAKE 2 CAPSULES BY MOUTH EVERY EVENING AND 1 EVERY MORNING 90 Cap 2    lithium carbonate 150 mg capsule TAKE ONE CAPSULE BY MOUTH EVERY MORNING ALONG WITH 300 MG 30 Cap 2    Sprintec, 28, 0.25-35 mg-mcg tab TK 1 T PO D      PNV PL.63/WLSLXYP fum/folic ac (PRENATAL MULTIVITAMINS PO) Take  by mouth.            medication changes made today: cont lithium, seroquel and prn klonopin    2. Counseling and coordination of care including instructions for treatment, risks/benefits, risk factor reduction and patient/family education. She agrees with the plan. Patient instructed to call with any side effects, questions or issues. 3.    Follow-up and Dispositions    · Return in about 3 months (around 7/1/2021). Alyx Baez, who was evaluated through a synchronous (real-time) audio-video encounter, and/or her healthcare decision maker, is aware that it is a billable service, with coverage as determined by her insurance carrier. She provided verbal consent to proceed: Yes, and patient identification was verified. This visit was conducted pursuant to the emergency declaration under the [facility], [address] [place] [address] authority and the [employer] General Act. A caregiver was present when appropriate. Ability to conduct physical exam was limited. The patient was located in a state where the provider was credentialed to provide care.        4/1/2021  Jonathan Hines NP

## 2021-04-27 RX ORDER — LITHIUM CARBONATE 150 MG/1
CAPSULE ORAL
Qty: 30 CAP | Refills: 2 | OUTPATIENT
Start: 2021-04-27

## 2021-06-02 ENCOUNTER — OFFICE VISIT (OUTPATIENT)
Dept: BEHAVIORAL/MENTAL HEALTH CLINIC | Age: 31
End: 2021-06-02
Payer: MEDICAID

## 2021-06-02 VITALS
TEMPERATURE: 97.7 F | HEART RATE: 73 BPM | DIASTOLIC BLOOD PRESSURE: 57 MMHG | OXYGEN SATURATION: 98 % | WEIGHT: 133.2 LBS | HEIGHT: 62 IN | RESPIRATION RATE: 17 BRPM | SYSTOLIC BLOOD PRESSURE: 97 MMHG | BODY MASS INDEX: 24.51 KG/M2

## 2021-06-02 DIAGNOSIS — F41.9 ANXIETY: ICD-10-CM

## 2021-06-02 DIAGNOSIS — F31.60 BIPOLAR 1 DISORDER, MIXED (HCC): Primary | ICD-10-CM

## 2021-06-02 PROCEDURE — 99214 OFFICE O/P EST MOD 30 MIN: CPT | Performed by: NURSE PRACTITIONER

## 2021-06-02 RX ORDER — ETONOGESTREL 68 MG/1
IMPLANT SUBCUTANEOUS
COMMUNITY
End: 2022-05-05 | Stop reason: ALTCHOICE

## 2021-06-02 RX ORDER — LITHIUM CARBONATE 300 MG/1
300 CAPSULE ORAL
Qty: 90 CAPSULE | Refills: 2
Start: 2021-06-02 | End: 2021-08-12

## 2021-06-02 RX ORDER — QUETIAPINE FUMARATE 50 MG/1
TABLET, FILM COATED ORAL
Qty: 30 TABLET | Refills: 2 | Status: SHIPPED | OUTPATIENT
Start: 2021-06-02 | End: 2021-08-12 | Stop reason: SDUPTHER

## 2021-06-02 NOTE — PROGRESS NOTES
CHIEF COMPLAINT:  Alix Anderson is a 36 y.o. female and was seen today for follow-up of psychiatric condition and psychotropic medication management. HPI:    Yulissa Bennett reports the following psychiatric symptoms by hx:  depression, anxiety, margoth and hypomania. Overall symptoms have been present for years. Currently symptoms are of moderate severity. The symptoms occur somewhat more frequently since pt has been weaning off of medications. Pt reports medications are beneficial overall. Met with pt to review current treatment plan. FAMILY/SOCIAL HX: psychosocial stressors    REVIEW OF SYSTEMS:  Psychiatric:  depression, anxiety, hypomania/margoth  Appetite:good   Sleep: fitful at times  Neuro: no changes or updates    Visit Vitals  BP (!) 97/57 (BP 1 Location: Left arm, BP Patient Position: Sitting, BP Cuff Size: Adult)   Pulse 73   Temp 97.7 °F (36.5 °C) (Temporal)   Resp 17   Ht 5' 2\" (1.575 m)   Wt 60.4 kg (133 lb 3.2 oz)   SpO2 98%   BMI 24.36 kg/m²       Side Effects:  none    MENTAL STATUS EXAM:   Sensorium  oriented to time, place and person   Relations cooperative   Appearance:  age appropriate and casually dressed   Motor Behavior:  gait stable and within normal limits   Speech:  normal volume   Thought Process: goal directed   Thought Content free of delusions and free of hallucinations   Suicidal ideations no intention   Homicidal ideations no intention   Mood:  anxious and sad   Affect:  anxious and sad   Memory recent  adequate   Memory remote:  adequate   Concentration:  adequate   Abstraction:  abstract   Insight:  fair and good   Reliability good and fair   Judgment:  fair and good     MEDICAL DECISION MAKING:  Problems addressed today:    ICD-10-CM ICD-9-CM    1. Bipolar 1 disorder, mixed ([facility]  F31.60 296.60    2. Anxiety  F41.9 300.00        Assessment:   Yulissa Bennett is responding to treatment overall. Currently mood and anxiety symptoms are slightly exacerbated.  Pt reports she has been weaning off of lithium. She stated she will complete weaning next week. Discussed current medications and dosages. Will continue with seroquel and prn klonopin at this time. Discussed possibility of re-starting lithium if needed (bipolar symptoms become exacerbated). Pt would like to be off of medications so she can get pregnant in the future. Discussed use of a mood journal to track symptoms. Reviewed treatment goals and target symptoms to monitor for. Pt reports she has been using 1/2 to 1 tab klonopin prn with benefit. Plan:   1. Current Outpatient Medications   Medication Sig Dispense Refill    etonogestreL (Nexplanon) 68 mg impl by SubDERmal route.  lithium carbonate 300 mg capsule Take 1 Capsule by mouth nightly. 90 Capsule 2    QUEtiapine (SEROquel) 50 mg tablet TAKE 1 TABLET BY MOUTH EVERY NIGHT AT BEDTIME 30 Tablet 2    clonazePAM (KlonoPIN) 1 mg tablet Take 1 Tab by mouth daily as needed for Anxiety. 30 Tab 0    Sprintec, 28, 0.25-35 mg-mcg tab TK 1 T PO D (Patient not taking: Reported on 6/2/2021)      PNV ND.18/FOPKNDG fum/folic ac (PRENATAL MULTIVITAMINS PO) Take  by mouth. (Patient not taking: Reported on 6/2/2021)            medication changes made today: complete weaning off of lithium, cont seroquel and cont prn klonopin    2. Counseling and coordination of care including instructions for treatment, risks/benefits, risk factor reduction and patient/family education. She agrees with the plan. Patient instructed to call with any side effects, questions or issues. 3.    Follow-up and Dispositions    · Return in about 2 months (around 8/2/2021).          6/2/2021  Curtis Diaz NP

## 2021-06-02 NOTE — PROGRESS NOTES
Chief Complaint   Patient presents with    Medication Management     Visit Vitals  BP (!) 97/57 (BP 1 Location: Left arm, BP Patient Position: Sitting, BP Cuff Size: Adult)   Pulse 73   Temp 97.7 °F (36.5 °C) (Temporal)   Resp 17   Ht 5' 2\" (1.575 m)   Wt 60.4 kg (133 lb 3.2 oz)   SpO2 98%   BMI 24.36 kg/m²     Patient states bp is a little low for her. She denies dizziness/lightheaded.

## 2021-08-12 ENCOUNTER — VIRTUAL VISIT (OUTPATIENT)
Dept: BEHAVIORAL/MENTAL HEALTH CLINIC | Age: 31
End: 2021-08-12
Payer: MEDICAID

## 2021-08-12 DIAGNOSIS — F31.77 BIPOLAR DISORDER, IN PARTIAL REMISSION, MOST RECENT EPISODE MIXED (HCC): Primary | ICD-10-CM

## 2021-08-12 DIAGNOSIS — F41.9 ANXIETY: ICD-10-CM

## 2021-08-12 PROCEDURE — 99214 OFFICE O/P EST MOD 30 MIN: CPT | Performed by: NURSE PRACTITIONER

## 2021-08-12 RX ORDER — CLONAZEPAM 1 MG/1
1 TABLET ORAL
Qty: 30 TABLET | Refills: 0 | Status: SHIPPED | OUTPATIENT
Start: 2021-08-12 | End: 2021-10-11

## 2021-08-12 RX ORDER — QUETIAPINE FUMARATE 50 MG/1
TABLET, FILM COATED ORAL
Qty: 30 TABLET | Refills: 2 | Status: SHIPPED | OUTPATIENT
Start: 2021-08-12 | End: 2021-09-13

## 2021-08-12 NOTE — PROGRESS NOTES
CHIEF COMPLAINT:  Esteban Montenegro is a 36 y.o. female and was seen today for follow-up of psychiatric condition and psychotropic medication management. HPI:    Xiomara Alas reports the following psychiatric symptoms by hx:  depression, agitation, anxiety, margoth and hypomania. Overall symptoms have been present for months. Currently symptoms are of moderate to mod/high severity. The symptoms occur somewhat more frequently with current stressors. Pt reports medications are beneficial. Met with pt via video telehealth for appt today to review current treatment plan. FAMILY/SOCIAL HX: psychosocial stressors    REVIEW OF SYSTEMS:  Psychiatric symptoms being monitored for:  depression, anxiety, hypomania/margoth  Appetite:good   Sleep: fitful   Neuro: no changes    There were no vitals taken for this visit.: virtual    Side Effects:  none    MENTAL STATUS EXAM:   Sensorium  oriented to time, place and person   Relations cooperative   Appearance:  age appropriate and casually dressed   Motor Behavior:  gait stable and within normal limits   Speech:  normal volume   Thought Process: goal directed   Thought Content free of delusions and free of hallucinations   Suicidal ideations no intention   Homicidal ideations no intention   Mood:  anxious   Affect:  anxious   Memory recent  adequate   Memory remote:  adequate   Concentration:  adequate   Abstraction:  abstract   Insight:  good   Reliability good   Judgment:  good     MEDICAL DECISION MAKING:  Problems addressed today:    ICD-10-CM ICD-9-CM    1. Bipolar disorder, in partial remission, most recent episode mixed (HCC)  F31.77 296.65 clonazePAM (KlonoPIN) 1 mg tablet   2. Anxiety  F41.9 300.00        Assessment:   Xiomara Alas is responding to treatment. Symptoms are intermittently exacerbated. Discussed current medications and dosages. No changes required today. Will cont with seroquel and prn klonopin. Pt is actively working to learn calming and stabilizing techniques.  Pt continues to want to plan for a safe pregnancy. Reviewed treatment goals and target symptoms to monitor for. Plan:   1. Current Outpatient Medications   Medication Sig Dispense Refill    clonazePAM (KlonoPIN) 1 mg tablet Take 1 Tablet by mouth daily as needed for Anxiety. 30 Tablet 0    QUEtiapine (SEROquel) 50 mg tablet TAKE 1 TABLET BY MOUTH EVERY NIGHT AT BEDTIME 30 Tablet 2    etonogestreL (Nexplanon) 68 mg impl by SubDERmal route.  Sprintec, 28, 0.25-35 mg-mcg tab TK 1 T PO D (Patient not taking: Reported on 6/2/2021)      PNV ZV.15/CYNXNNP fum/folic ac (PRENATAL MULTIVITAMINS PO) Take  by mouth. (Patient not taking: Reported on 6/2/2021)            medication changes made today: cont seroquel and prn klonopin    2. Counseling and coordination of care including instructions for treatment, risks/benefits, risk factor reduction and patient/family education. She agrees with the plan. Patient instructed to call with any side effects, questions or issues. 3.    Follow-up and Dispositions    · Return in about 3 months (around 11/12/2021). Esteban Montenegro, who was evaluated through a synchronous (real-time) audio-video encounter, and/or her healthcare decision maker, is aware that it is a billable service, with coverage as determined by her insurance carrier. She provided verbal consent to proceed: Yes, and patient identification was verified. This visit was conducted pursuant to the emergency declaration under the [facility], [address] [place] [address] authority and the [employer] General Act. A caregiver was present when appropriate. Ability to conduct physical exam was limited. The patient was located in a state where the provider was credentialed to provide care.      8/12/2021  Anabel De NP

## 2021-09-13 RX ORDER — QUETIAPINE FUMARATE 50 MG/1
TABLET, FILM COATED ORAL
Qty: 30 TABLET | Refills: 2 | Status: SHIPPED | OUTPATIENT
Start: 2021-09-13 | End: 2021-10-18 | Stop reason: SDUPTHER

## 2021-10-10 DIAGNOSIS — F31.77 BIPOLAR DISORDER, IN PARTIAL REMISSION, MOST RECENT EPISODE MIXED (HCC): ICD-10-CM

## 2021-10-11 RX ORDER — CLONAZEPAM 1 MG/1
1 TABLET ORAL
Qty: 30 TABLET | OUTPATIENT
Start: 2021-10-11

## 2021-10-11 RX ORDER — CLONAZEPAM 1 MG/1
1 TABLET ORAL
Qty: 30 TABLET | Refills: 0 | Status: SHIPPED | OUTPATIENT
Start: 2021-10-11 | End: 2022-02-24 | Stop reason: SDUPTHER

## 2021-10-18 ENCOUNTER — OFFICE VISIT (OUTPATIENT)
Dept: BEHAVIORAL/MENTAL HEALTH CLINIC | Age: 31
End: 2021-10-18
Payer: MEDICAID

## 2021-10-18 VITALS
SYSTOLIC BLOOD PRESSURE: 110 MMHG | BODY MASS INDEX: 22.26 KG/M2 | HEIGHT: 62 IN | WEIGHT: 121 LBS | TEMPERATURE: 97.2 F | OXYGEN SATURATION: 99 % | DIASTOLIC BLOOD PRESSURE: 69 MMHG | HEART RATE: 84 BPM | RESPIRATION RATE: 16 BRPM

## 2021-10-18 DIAGNOSIS — F41.9 ANXIETY: ICD-10-CM

## 2021-10-18 DIAGNOSIS — F31.77 BIPOLAR DISORDER, IN PARTIAL REMISSION, MOST RECENT EPISODE MIXED (HCC): Primary | ICD-10-CM

## 2021-10-18 PROCEDURE — 99214 OFFICE O/P EST MOD 30 MIN: CPT | Performed by: NURSE PRACTITIONER

## 2021-10-18 RX ORDER — QUETIAPINE FUMARATE 100 MG/1
100 TABLET, FILM COATED ORAL
Qty: 30 TABLET | Refills: 2 | Status: SHIPPED | OUTPATIENT
Start: 2021-10-18 | End: 2021-12-14 | Stop reason: SDUPTHER

## 2021-10-18 RX ORDER — QUETIAPINE FUMARATE 25 MG/1
25 TABLET, FILM COATED ORAL
Qty: 30 TABLET | Refills: 0 | Status: SHIPPED | OUTPATIENT
Start: 2021-10-18 | End: 2021-11-15

## 2021-10-18 NOTE — PROGRESS NOTES
Chief Complaint   Patient presents with    Medication Management     Visit Vitals  /69 (BP 1 Location: Left arm, BP Patient Position: Sitting, BP Cuff Size: Adult)   Pulse 84   Temp 97.2 °F (36.2 °C) (Temporal)   Resp 16   Ht 5' 2\" (1.575 m)   Wt 54.9 kg (121 lb)   SpO2 99%   BMI 22.13 kg/m²     Prior to Admission medications    Medication Sig Start Date End Date Taking? Authorizing Provider   clonazePAM (KlonoPIN) 1 mg tablet TAKE 1 TABLET BY MOUTH DAILY AS NEEDED FOR ANXIETY 10/11/21  Yes Chaparrita Hairston NP   QUEtiapine (SEROquel) 50 mg tablet TAKE 1 TABLET BY MOUTH EVERY NIGHT AT BEDTIME 9/13/21  Yes Chaparrita Hairston NP   etonogestreL (Nexplanon) 68 mg impl by SubDERmal route. Yes Provider, Historical   Sprintec, 28, 0.25-35 mg-mcg tab TK 1 T PO D  Patient not taking: Reported on 6/2/2021 7/8/20   Provider, Historical   PNV DT.33/AIDOLLT fum/folic ac (PRENATAL MULTIVITAMINS PO) Take  by mouth.   Patient not taking: Reported on 10/18/2021    Provider, Historical

## 2021-10-18 NOTE — PROGRESS NOTES
CHIEF COMPLAINT:  Alla Hayward is a 36 y.o. female and was seen today for follow-up of psychiatric condition and psychotropic medication management. HPI:    Escobar Oscar reports the following psychiatric symptoms by hx: psychosis, depression, agitation, anxiety, margoth and hypomania. Overall symptoms have been present for years. Currently symptoms are of moderate/high severity. The symptoms occur intermittently. Pt reports medications are beneficial. Met with pt for appt today to review current treatment plan. FAMILY/SOCIAL HX: intermittent psychosocial stressors    REVIEW OF SYSTEMS:  Psychiatric symptoms being monitored for:  depression, anxiety, agitation, hypomania/margoth  Appetite:good   Sleep: fitful, problems falling asleep at times  Neuro: no changes reported    Visit Vitals  /69 (BP 1 Location: Left arm, BP Patient Position: Sitting, BP Cuff Size: Adult)   Pulse 84   Temp 97.2 °F (36.2 °C) (Temporal)   Resp 16   Ht 5' 2\" (1.575 m)   Wt 54.9 kg (121 lb)   SpO2 99%   BMI 22.13 kg/m²       Side Effects:  none    MENTAL STATUS EXAM:   Sensorium  oriented to time, place and person   Relations cooperative   Appearance:  age appropriate and casually dressed   Motor Behavior:  gait stable and within normal limits   Speech:  normal volume   Thought Process: goal directed   Thought Content free of delusions and free of hallucinations   Suicidal ideations no intention   Homicidal ideations no intention   Mood:  within normal limits   Affect:  wnl   Memory recent  adequate   Memory remote:  adequate   Concentration:  adequate   Abstraction:  abstract   Insight:  good   Reliability good   Judgment:  good     MEDICAL DECISION MAKING:  Problems addressed today:    ICD-10-CM ICD-9-CM    1. Bipolar disorder, in partial remission, most recent episode mixed ([facility])  F31.77 296.65    2. Anxiety  F41.9 300.00        Assessment:   Escobar Oscar is responding to treatment. Symptoms are stable overall.  Discussed current medications and dosages. Will increase seroquel as pt has had some breakthrough symptoms. Pt shared feedback from OB who reports she recommends use of seroquel and/or lamictal.  Reviewed treatment goals and target symptoms to monitor for. Will increase seroquel for better maintenance response. Pt reports x1 klonopin for a panic attack. She is aware she is not allowed to use during pregnancy. Pt is using as directed and is aware of prn and safety guidelines. Plan:   1. Current Outpatient Medications   Medication Sig Dispense Refill    QUEtiapine (SEROquel) 100 mg tablet Take 1 Tablet by mouth nightly. 30 Tablet 2    QUEtiapine (SEROquel) 25 mg tablet Take 1 Tablet by mouth daily as needed for Other (anxiety and/or agitation). 30 Tablet 0    clonazePAM (KlonoPIN) 1 mg tablet TAKE 1 TABLET BY MOUTH DAILY AS NEEDED FOR ANXIETY 30 Tablet 0    etonogestreL (Nexplanon) 68 mg impl by SubDERmal route.  Sprintec, 28, 0.25-35 mg-mcg tab TK 1 T PO D (Patient not taking: Reported on 6/2/2021)      PNV YC.87/IGZNXZD fum/folic ac (PRENATAL MULTIVITAMINS PO) Take  by mouth. (Patient not taking: Reported on 10/18/2021)            medication changes made today: increase seroquel, cont prn klonopin (until pt become pregnant)    2. Counseling and coordination of care including instructions for treatment, risks/benefits, risk factor reduction and patient/family education. She agrees with the plan. Patient instructed to call with any side effects, questions or issues. 3.    Follow-up and Dispositions    · Return in about 3 months (around 1/18/2022).          10/18/2021  Corine Walter NP

## 2021-12-09 RX ORDER — QUETIAPINE FUMARATE 50 MG/1
TABLET, FILM COATED ORAL
Qty: 30 TABLET | Refills: 2 | OUTPATIENT
Start: 2021-12-09

## 2021-12-14 RX ORDER — QUETIAPINE FUMARATE 100 MG/1
100 TABLET, FILM COATED ORAL
Qty: 30 TABLET | Refills: 2 | Status: SHIPPED | OUTPATIENT
Start: 2021-12-14 | End: 2021-12-15 | Stop reason: SDUPTHER

## 2021-12-14 RX ORDER — QUETIAPINE FUMARATE 25 MG/1
TABLET, FILM COATED ORAL
Qty: 30 TABLET | Refills: 2 | OUTPATIENT
Start: 2021-12-14

## 2021-12-15 NOTE — TELEPHONE ENCOUNTER
Patient called back after speaking with her insurance and pharmacy. Jose will transfer prescription to [facility], who is under contract with her insurance. All future scripts are to be sent to [facility].

## 2021-12-15 NOTE — TELEPHONE ENCOUNTER
Patient called to say because of her insurance, she needs to change pharmacy. She would like her medications to go to [facility] on [address]. She says she got a notification from the pharmacy that they could not fill her prescription. Please advise.

## 2021-12-16 RX ORDER — QUETIAPINE FUMARATE 100 MG/1
100 TABLET, FILM COATED ORAL
Qty: 30 TABLET | Refills: 2 | Status: SHIPPED | OUTPATIENT
Start: 2021-12-16 | End: 2021-12-23 | Stop reason: SDUPTHER

## 2021-12-23 ENCOUNTER — VIRTUAL VISIT (OUTPATIENT)
Dept: BEHAVIORAL/MENTAL HEALTH CLINIC | Age: 31
End: 2021-12-23
Payer: MEDICAID

## 2021-12-23 DIAGNOSIS — F31.77 BIPOLAR DISORDER, IN PARTIAL REMISSION, MOST RECENT EPISODE MIXED (HCC): Primary | ICD-10-CM

## 2021-12-23 PROCEDURE — 99214 OFFICE O/P EST MOD 30 MIN: CPT | Performed by: NURSE PRACTITIONER

## 2021-12-23 RX ORDER — QUETIAPINE FUMARATE 200 MG/1
200 TABLET, FILM COATED ORAL
Qty: 30 TABLET | Refills: 1 | Status: SHIPPED | OUTPATIENT
Start: 2021-12-23 | End: 2022-03-01 | Stop reason: SDUPTHER

## 2021-12-23 NOTE — PROGRESS NOTES
CHIEF COMPLAINT:  Chantelle Glover is a 36 y.o. female and was seen today for follow-up of psychiatric condition and psychotropic medication management. HPI:    Denisse Marcos reports the following psychiatric symptoms by hx:  depression, agitation, anxiety, margoth and hypomania. Overall symptoms have been present for the last few weeks. Currently symptoms are of moderate/high severity. The symptoms occur more frequently and more severely. Pt reports medications are somewhat beneficial. Met with pt via video telehealth for appt today to review current treatment plan. FAMILY/SOCIAL HX: psychosocial stressors    REVIEW OF SYSTEMS:  Psychiatric symptoms being monitored for:  depression, anxiety, agitation, hypomania/margoth  Appetite:fair   Sleep: poor with DIMS (difficulty initiating & maintaining sleep)   Neuro: no updates    There were no vitals taken for this visit.: virtual    Side Effects:  none    MENTAL STATUS EXAM:   Sensorium  oriented to time, place and person   Relations cooperative   Appearance:  age appropriate and casually dressed   Motor Behavior:  gait stable and within normal limits   Speech:  normal volume   Thought Process: goal directed   Thought Content free of delusions and free of hallucinations   Suicidal ideations no intention   Homicidal ideations no intention   Mood:  Depressed, hypomania   Affect:  Constricted, depressed   Memory recent  adequate   Memory remote:  adequate   Concentration:  adequate   Abstraction:  abstract   Insight:  good   Reliability good   Judgment:  good     MEDICAL DECISION MAKING:  Problems addressed today:    ICD-10-CM ICD-9-CM    1. Bipolar disorder, in partial remission, most recent episode mixed [facility])  F31.77 296.65        Assessment:   Denisse Marcos is responding to treatment. Symptoms are exacerbated. Discussed current medications and dosages. Will increase seroquel to 200 mg and may increase to 300 mg if needed.  Discussed at length bipolar management and provided psychoeducation. Reviewed treatment goals and target symptoms to monitor for. Pt still hopes to get pregnant. Advised her to meet with her OB to begin to identify a plan for med management. Plan:   1. Current Outpatient Medications   Medication Sig Dispense Refill    QUEtiapine (SEROquel) 100 mg tablet Take 1 Tablet by mouth nightly. 30 Tablet 2    QUEtiapine (SEROquel) 25 mg tablet TAKE 1 TABLET BY MOUTH DAILY AS NEEDED FOR ANXIETY OR AGITATION 30 Tablet 2    clonazePAM (KlonoPIN) 1 mg tablet TAKE 1 TABLET BY MOUTH DAILY AS NEEDED FOR ANXIETY 30 Tablet 0    etonogestreL (Nexplanon) 68 mg impl by SubDERmal route.  Sprintec, 28, 0.25-35 mg-mcg tab TK 1 T PO D (Patient not taking: Reported on 6/2/2021)      PNV RD.92/FIXRWXO fum/folic ac (PRENATAL MULTIVITAMINS PO) Take  by mouth. (Patient not taking: Reported on 10/18/2021)            medication changes made today: increase seroquel 200 mg, may increase to 300 mg if needed, cont prn klonopin    2. Counseling and coordination of care including instructions for treatment, risks/benefits, risk factor reduction and patient/family education. She agrees with the plan. Patient instructed to call with any side effects, questions or issues. 3.    Follow-up and Dispositions    · Return in about 3 months (around 3/23/2022). Tisha Torres, who was evaluated through a synchronous (real-time) audio-video encounter, and/or her healthcare decision maker, is aware that it is a billable service, with coverage as determined by her insurance carrier. She provided verbal consent to proceed: Yes, and patient identification was verified. This visit was conducted pursuant to the emergency declaration under the [facility] authority and the [employer] Act. A caregiver was present when appropriate. Ability to conduct physical exam was limited.  The patient was located in a state where the provider was credentialed to provide care.      12/23/2021  Lorraine Portillo NP

## 2022-02-22 ENCOUNTER — PATIENT MESSAGE (OUTPATIENT)
Dept: BEHAVIORAL/MENTAL HEALTH CLINIC | Age: 32
End: 2022-02-22

## 2022-02-22 NOTE — TELEPHONE ENCOUNTER
Patient returned voice mail left. Patient stated she is having severe anxiety that are leading into panic attacks. Patient stated she is having thoughts of impending doom and that \"things were going to go wrong. \" Patient stated she has good family support. Patient stated she had good family support and has been leaning on them. Patient states she does have klonopin, but plans to get pregnant in the near future. No recent pregnancy and has not been attempting. Patient will try 1/2 klonopin if symptoms are severe. Patient encouraged to go to the ED if symptoms increase.  Per provider, placed patient on cancellation list.

## 2022-02-22 NOTE — TELEPHONE ENCOUNTER
----- Message from Esthela Degroot sent at 2/22/2022  1:34 PM EST -----  Regarding: Increased Symptoms   Robin Easton,    I just left a message with the  about contacting you with some symptoms Ive been having. Is there any opening for a phone appointment today? My anxiety has been high, Ive been having some weird delusional thoughts, my sleep the past few nights has been bad. Im not sure whats going on or what the best route to take is. Im planning on addressing the sleep first and hopefully the other symptoms will slow down.      Thank you,  Kanchan Minus

## 2022-02-24 ENCOUNTER — TELEPHONE (OUTPATIENT)
Dept: BEHAVIORAL/MENTAL HEALTH CLINIC | Age: 32
End: 2022-02-24

## 2022-02-24 DIAGNOSIS — F31.77 BIPOLAR DISORDER, IN PARTIAL REMISSION, MOST RECENT EPISODE MIXED (HCC): ICD-10-CM

## 2022-02-24 RX ORDER — CLONAZEPAM 1 MG/1
1 TABLET ORAL
Qty: 30 TABLET | Refills: 0 | Status: SHIPPED | OUTPATIENT
Start: 2022-02-24 | End: 2022-05-02 | Stop reason: SDUPTHER

## 2022-02-24 NOTE — TELEPHONE ENCOUNTER
----- Message from Pedro Pablo Disla sent at 2/22/2022  3:14 PM EST -----  Regarding: Increased Symptoms   My  Manuel Pardo is picking up a pregnancy test before using the Klonopin as an additional sleep aid for tonight. He is also going to work from home tomorrow for additional support. Im using the 25mg Seroquel Bryce Linder subscribed to help with the anxiety today until I take the pregnancy test. Im feeling okay right now and more confident with management and staying ahead of these symptoms.      Thank you for the support,  Manjinder Ventura

## 2022-02-24 NOTE — TELEPHONE ENCOUNTER
Spoke to provider regarding patient symptoms. Per provider patient can take Seroquel 25mg when she feels anxious or agitated. Patient can take Klonopin for panic attack provided she has had a negative pregnancy test.  Patient added to cancellatoin list.     Patient informed of above information . She stated she took 1/2 tab klonopin two nights ago after testing negative for pregnancy. Patient also stated she took Seroquel 25mg yesterday and may take another today. She expressed an understanding to instruction given by provider. Patient had no further questions.

## 2022-03-01 ENCOUNTER — VIRTUAL VISIT (OUTPATIENT)
Dept: BEHAVIORAL/MENTAL HEALTH CLINIC | Age: 32
End: 2022-03-01
Payer: MEDICAID

## 2022-03-01 DIAGNOSIS — F41.9 ANXIETY: ICD-10-CM

## 2022-03-01 DIAGNOSIS — F31.77 BIPOLAR DISORDER, IN PARTIAL REMISSION, MOST RECENT EPISODE MIXED (HCC): Primary | ICD-10-CM

## 2022-03-01 PROCEDURE — 99214 OFFICE O/P EST MOD 30 MIN: CPT | Performed by: NURSE PRACTITIONER

## 2022-03-01 RX ORDER — QUETIAPINE FUMARATE 300 MG/1
300 TABLET, FILM COATED ORAL
Qty: 30 TABLET | Refills: 2 | Status: SHIPPED | OUTPATIENT
Start: 2022-03-01 | End: 2022-03-22 | Stop reason: SDUPTHER

## 2022-03-01 RX ORDER — QUETIAPINE FUMARATE 25 MG/1
TABLET, FILM COATED ORAL
Qty: 30 TABLET | Refills: 2 | Status: SHIPPED | OUTPATIENT
Start: 2022-03-01 | End: 2022-03-22 | Stop reason: DRUGHIGH

## 2022-03-01 NOTE — PROGRESS NOTES
CHIEF COMPLAINT:  Shama Alonso is a 36 y.o. female and was seen today for follow-up of psychiatric condition and psychotropic medication management. HPI:    Nichelle Avila reports the following psychiatric symptoms by hx:  depression, agitation, anxiety, margoth and hypomania. Overall symptoms have been present for months. Currently symptoms are of moderate/high severity. The symptoms occur intermittently. Pt reports medications are of some benefit. Has been on seroquel 300 mg and prn klonopin. Met with pt via video telehealth for appt today to review current treatment plan. FAMILY/SOCIAL HX: psychosocial stressors    REVIEW OF SYSTEMS:  Psychiatric symptoms being monitored for:  depression, anxiety, agitation, hypomania/margoth  Appetite:no change from normal   Sleep: fitful at times  Neuro: denies    There were no vitals taken for this visit.: virtual    Side Effects:  none    MENTAL STATUS EXAM:   Sensorium  oriented to time, place and person   Relations cooperative   Appearance:  age appropriate and casually dressed   Motor Behavior:  gait stable and within normal limits   Speech:  normal volume   Thought Process: goal directed   Thought Content free of delusions and free of hallucinations   Suicidal ideations no intention   Homicidal ideations no intention   Mood:  anxious and depressed   Affect:  anxious and depressed   Memory recent  adequate   Memory remote:  adequate   Concentration:  adequate and impaired   Abstraction:  abstract   Insight:  good   Reliability good   Judgment:  good     MEDICAL DECISION MAKING:  Problems addressed today:    ICD-10-CM ICD-9-CM    1. Bipolar disorder, in partial remission, most recent episode mixed (HCC)  F31.77 296.65    2. Anxiety  F41.9 300.00        Assessment:   Nichelle Avila is responding to treatment. Symptoms are exacerbated and now beginning to re-stabilize. Discussed current medications and dosages. Conformed use of seroquel 300 mg nightly.  Pt will stick with this higher dose which has been beneficial. Pt will also continue to use 25 mg prn. Pt is trying not to use klonopin as she still wants to get pregnant. Reviewed treatment goals and target symptoms to monitor for. Plan:   1. Current Outpatient Medications   Medication Sig Dispense Refill    clonazePAM (KlonoPIN) 1 mg tablet Take 1 Tablet by mouth daily as needed for Anxiety. 30 Tablet 0    QUEtiapine (SEROquel) 200 mg tablet Take 1 Tablet by mouth nightly. If symptoms remain persistent increase to 300 mg. 30 Tablet 1    QUEtiapine (SEROquel) 25 mg tablet TAKE 1 TABLET BY MOUTH DAILY AS NEEDED FOR ANXIETY OR AGITATION 30 Tablet 2    etonogestreL (Nexplanon) 68 mg impl by SubDERmal route.  Sprintec, 28, 0.25-35 mg-mcg tab TK 1 T PO D (Patient not taking: Reported on 6/2/2021)      PNV XT.49/WEQXSTP fum/folic ac (PRENATAL MULTIVITAMINS PO) Take  by mouth. (Patient not taking: Reported on 10/18/2021)            medication changes made today: increase scheduled dose of seroquel to 300 mg, cont 25 mg prn dose and pt may use bid, hold prn klonopin except for high severity anxiety if not pregnant    2. Counseling and coordination of care including instructions for treatment, risks/benefits, risk factor reduction and patient/family education. She agrees with the plan. Patient instructed to call with any side effects, questions or issues. 3.    Follow-up and Dispositions    · Return in about 3 months (around 6/1/2022). Chuy Orellana, who was evaluated through a synchronous (real-time) audio-video encounter, and/or her healthcare decision maker, is aware that it is a billable service, which includes applicable co-pays, with coverage as determined by her insurance carrier. She provided verbal consent to proceed and patient identification was verified.  This visit was conducted pursuant to the emergency declaration under the [facility], [address] waiver authority and the Coronavirus Preparedness and Response Supplemental Appropriations Act. A caregiver was present when appropriate. Ability to conduct physical exam was limited. The patient was located at home in a state where the provider was licensed to provide care.        3/1/2022  Mary Saeed NP

## 2022-03-19 PROBLEM — M24.9 HYPERMOBILE JOINTS: Status: ACTIVE | Noted: 2019-01-01

## 2022-03-22 ENCOUNTER — VIRTUAL VISIT (OUTPATIENT)
Dept: BEHAVIORAL/MENTAL HEALTH CLINIC | Age: 32
End: 2022-03-22
Payer: MEDICAID

## 2022-03-22 DIAGNOSIS — F41.9 ANXIETY: ICD-10-CM

## 2022-03-22 DIAGNOSIS — F31.32 BIPOLAR 1 DISORDER, DEPRESSED, MODERATE (HCC): Primary | ICD-10-CM

## 2022-03-22 PROCEDURE — 99214 OFFICE O/P EST MOD 30 MIN: CPT | Performed by: NURSE PRACTITIONER

## 2022-03-22 RX ORDER — QUETIAPINE FUMARATE 400 MG/1
400 TABLET, FILM COATED ORAL
Qty: 30 TABLET | Refills: 1 | Status: SHIPPED | OUTPATIENT
Start: 2022-03-22 | End: 2022-05-02 | Stop reason: SDUPTHER

## 2022-03-22 NOTE — PROGRESS NOTES
CHIEF COMPLAINT:  Esteban Green is a 36 y.o. female and was seen today for follow-up of psychiatric condition and psychotropic medication management. HPI:    Veronica Adams reports the following psychiatric symptoms by hx:  depression, anxiety, margoth, hypomania and psychosis. Overall symptoms have been present for several weeks. Currently manic symptoms are of moderate/high severity. The symptoms occur daily. Pt reports medications are of some benefit. She reports she stopped taking seroquel and had severe symptoms this past weekend. She states she did use klonopin for sleep and re-started medication which has been effective. Met with pt via video telehealth for appt today to review current treatment plan. FAMILY/SOCIAL HX: psychosocial stressors    REVIEW OF SYSTEMS:  Psychiatric symptoms being monitored for:  depression, anxiety, agitation, hypomania, margoth, psychosis  Appetite:variable   Sleep: poor with DIMS (difficulty initiating & maintaining sleep), decreased need for sleep, has been working on getting sleep   Neuro: none    There were no vitals taken for this visit.: virtual    Side Effects:  none    MENTAL STATUS EXAM:   Sensorium  oriented to time, place and person   Relations cooperative   Appearance:  age appropriate and casually dressed   Motor Behavior:  gait stable and within normal limits   Speech:  normal volume   Thought Process: goal directed and tangential   Thought Content delusions and free of hallucinations   Suicidal ideations no intention   Homicidal ideations no intention   Mood:  anxious, depressed and euthymic, labile   Affect:  increased in intensity and labile   Memory recent  adequate   Memory remote:  adequate   Concentration:  adequate and impaired   Abstraction:  abstract   Insight:  fair and limited   Reliability fair   Judgment:  fair     MEDICAL DECISION MAKING:  Problems addressed today:    ICD-10-CM ICD-9-CM    1.  Bipolar 1 disorder, depressed, moderate ([facility])  F31.32 296.52 2. Anxiety  F41.9 300.00        Assessment:   Nehemias Flowers is not responding to treatment. Symptoms are exacerbated. Discussed current medications and dosages. Reviewed med management for bipolar disorder with psychosis and informed pt lamictal will not treat margoth/psychosis and so we can't focus on lamictal as monotherapy at this time. Will increase seroquel for mood stabilization. Reviewed treatment goals and target symptoms to monitor for. Pt advised to call if symptoms fail to stabilize so we can review dosing. Plan:   1. Current Outpatient Medications   Medication Sig Dispense Refill    QUEtiapine (SEROquel) 300 mg tablet Take 1 Tablet by mouth nightly. 30 Tablet 2    QUEtiapine (SEROquel) 25 mg tablet TAKE 1 TABLET BY MOUTH DAILY AS NEEDED FOR ANXIETY OR AGITATION 30 Tablet 2    clonazePAM (KlonoPIN) 1 mg tablet Take 1 Tablet by mouth daily as needed for Anxiety. 30 Tablet 0    etonogestreL (Nexplanon) 68 mg impl by SubDERmal route.  Sprintec, 28, 0.25-35 mg-mcg tab TK 1 T PO D (Patient not taking: Reported on 6/2/2021)      PNV EG.02/RDWQZQY fum/folic ac (PRENATAL MULTIVITAMINS PO) Take  by mouth. (Patient not taking: Reported on 10/18/2021)            medication changes made today: increase seroquel 400 mg, cont prn klonopin    2. Counseling and coordination of care including instructions for treatment, risks/benefits, risk factor reduction and patient/family education. She agrees with the plan. Patient instructed to call with any side effects, questions or issues. 3.    Follow-up and Dispositions    · Return in about 3 months (around 6/22/2022). Latoya Richardson, who was evaluated through a synchronous (real-time) audio-video encounter, and/or her healthcare decision maker, is aware that it is a billable service, which includes applicable co-pays, with coverage as determined by her insurance carrier. She provided verbal consent to proceed and patient identification was verified.  This visit was conducted pursuant to the emergency declaration under the [address] [facility], [address] [place] [address] authority and the [employer] Act. A caregiver was present when appropriate. Ability to conduct physical exam was limited. The patient was located at home in a state where the provider was licensed to provide care.      3/22/2022  Karlene Pfeiffer NP

## 2022-05-02 ENCOUNTER — OFFICE VISIT (OUTPATIENT)
Dept: BEHAVIORAL/MENTAL HEALTH CLINIC | Age: 32
End: 2022-05-02
Payer: MEDICAID

## 2022-05-02 VITALS
DIASTOLIC BLOOD PRESSURE: 72 MMHG | WEIGHT: 122.4 LBS | HEIGHT: 62 IN | SYSTOLIC BLOOD PRESSURE: 102 MMHG | OXYGEN SATURATION: 95 % | BODY MASS INDEX: 22.52 KG/M2 | HEART RATE: 112 BPM | RESPIRATION RATE: 17 BRPM | TEMPERATURE: 97.3 F

## 2022-05-02 DIAGNOSIS — F31.32 BIPOLAR 1 DISORDER, DEPRESSED, MODERATE (HCC): Primary | ICD-10-CM

## 2022-05-02 DIAGNOSIS — F41.9 ANXIETY: ICD-10-CM

## 2022-05-02 DIAGNOSIS — F31.77 BIPOLAR DISORDER, IN PARTIAL REMISSION, MOST RECENT EPISODE MIXED (HCC): ICD-10-CM

## 2022-05-02 PROCEDURE — 99213 OFFICE O/P EST LOW 20 MIN: CPT | Performed by: NURSE PRACTITIONER

## 2022-05-02 RX ORDER — CLONAZEPAM 1 MG/1
1 TABLET ORAL
Qty: 30 TABLET | Refills: 0 | Status: SHIPPED | OUTPATIENT
Start: 2022-05-02 | End: 2022-06-03 | Stop reason: SDUPTHER

## 2022-05-02 RX ORDER — QUETIAPINE FUMARATE 400 MG/1
400 TABLET, FILM COATED ORAL
Qty: 30 TABLET | Refills: 3 | Status: SHIPPED | OUTPATIENT
Start: 2022-05-02 | End: 2022-06-03 | Stop reason: SDUPTHER

## 2022-05-02 NOTE — PROGRESS NOTES
Chief Complaint   Patient presents with    Medication Management     Visit Vitals  /72 (BP 1 Location: Right upper arm, BP Patient Position: Sitting, BP Cuff Size: Adult)   Pulse (!) 112   Temp 97.3 °F (36.3 °C) (Temporal)   Resp 17   Ht 5' 2\" (1.575 m)   Wt 55.5 kg (122 lb 6.4 oz)   SpO2 95%   BMI 22.39 kg/m²     Prior to Admission medications    Medication Sig Start Date End Date Taking? Authorizing Provider   QUEtiapine (SEROquel) 400 mg tablet Take 1 Tablet by mouth nightly. 3/22/22  Yes Allocca, Erven Rebolledo, NP   clonazePAM (KlonoPIN) 1 mg tablet Take 1 Tablet by mouth daily as needed for Anxiety. 2/24/22  Yes Allocca, Erven Rebolledo, NP   etonogestreL (Nexplanon) 68 mg impl by SubDERmal route. Patient not taking: Reported on 5/2/2022    Provider, Historical   Sprintec, 28, 0.25-35 mg-mcg tab TK 1 T PO D  Patient not taking: Reported on 6/2/2021 7/8/20   Provider, Historical   PNV HI.72/TZMZVUQ fum/folic ac (PRENATAL MULTIVITAMINS PO) Take  by mouth.   Patient not taking: Reported on 10/18/2021    Provider, Historical     3 most recent PHQ Screens 5/2/2022   Little interest or pleasure in doing things Not at all   Feeling down, depressed, irritable, or hopeless Not at all   Total Score PHQ 2 0

## 2022-05-02 NOTE — PROGRESS NOTES
CHIEF COMPLAINT:  Charles Hardin is a 36 y.o. female and was seen today for follow-up of psychiatric condition and psychotropic medication management. HPI:    Bhumika Harding reports the following psychiatric symptoms by hx:  depression, agitation, anxiety, margoth and hypomania. Overall symptoms have been present for months. Currently symptoms are of moderate/low severity. The symptoms occur less frequently and less severely with current medications. Pt reports medications are beneficial. Met with pt for appt today to review current treatment plan. FAMILY/SOCIAL HX: psychosocial stressors    REVIEW OF SYSTEMS:  Psychiatric symptoms being monitored for:  depression, anxiety, hypomania/margoth, agitation  Appetite:good   Sleep: improved   Neuro: RLS ? Visit Vitals  /72 (BP 1 Location: Right upper arm, BP Patient Position: Sitting, BP Cuff Size: Adult)   Pulse (!) 112   Temp 97.3 °F (36.3 °C) (Temporal)   Resp 17   Ht 5' 2\" (1.575 m)   Wt 55.5 kg (122 lb 6.4 oz)   SpO2 95%   BMI 22.39 kg/m²       Side Effects:  akathisia ? MENTAL STATUS EXAM:   Sensorium  oriented to time, place and person   Relations cooperative   Appearance:  age appropriate and casually dressed   Motor Behavior:  gait stable and within normal limits   Speech:  normal volume   Thought Process: goal directed   Thought Content free of delusions and free of hallucinations   Suicidal ideations no intention   Homicidal ideations no intention   Mood:  within normal limits   Affect:  wnl   Memory recent  adequate   Memory remote:  adequate   Concentration:  adequate   Abstraction:  abstract   Insight:  fair and good   Reliability good and fair   Judgment:  fair and good     MEDICAL DECISION MAKING:  Problems addressed today:    ICD-10-CM ICD-9-CM    1. Bipolar 1 disorder, depressed, moderate ([facility])  F31.32 296.52    2. Anxiety  F41.9 300.00        Assessment:   Bhumika Harding is responding to treatment. Symptoms are stabilizing.  Discussed current medications and dosages. Pt is doing well on current dose of seroquel. She states she has had some SE's vs RLS. She thinks symptoms do correspond with hihger doses of SGA. Discussed monitoring and increasing Mg and K in nutrition choices. She reports decreased need of klonopin. Revieweed risk vs benefit of use of klonoin for SE's. Pt will provided feedback in a week or so. Reviewed treatment goals and target symptoms to monitor for. Discussed transition of care. Plan:   1. Current Outpatient Medications   Medication Sig Dispense Refill    QUEtiapine (SEROquel) 400 mg tablet Take 1 Tablet by mouth nightly. 30 Tablet 1    clonazePAM (KlonoPIN) 1 mg tablet Take 1 Tablet by mouth daily as needed for Anxiety. 30 Tablet 0    etonogestreL (Nexplanon) 68 mg impl by SubDERmal route. (Patient not taking: Reported on 5/2/2022)      Sprintec, 28, 0.25-35 mg-mcg tab TK 1 T PO D (Patient not taking: Reported on 6/2/2021)      PNV AG.45/JPGTPXH fum/folic ac (PRENATAL MULTIVITAMINS PO) Take  by mouth. (Patient not taking: Reported on 10/18/2021)            medication changes made today: cont seroquel and prn klonopin    2. Counseling and coordination of care including instructions for treatment, risks/benefits, risk factor reduction and patient/family education. She agrees with the plan. Patient instructed to call with any side effects, questions or issues. 3.    Follow-up and Dispositions    · Return transition.          5/2/2022  Zulma Bolden NP

## 2022-05-03 ENCOUNTER — TELEPHONE (OUTPATIENT)
Dept: BEHAVIORAL/MENTAL HEALTH CLINIC | Age: 32
End: 2022-05-03

## 2022-05-03 NOTE — TELEPHONE ENCOUNTER
Left vm for patient to return call. Need to reschedule new patient appointment as four are scheduled for that day. Move to August visit.

## 2022-06-03 DIAGNOSIS — F31.77 BIPOLAR DISORDER, IN PARTIAL REMISSION, MOST RECENT EPISODE MIXED (HCC): ICD-10-CM

## 2022-06-03 RX ORDER — QUETIAPINE FUMARATE 400 MG/1
400 TABLET, FILM COATED ORAL
Qty: 30 TABLET | Refills: 3 | Status: SHIPPED | OUTPATIENT
Start: 2022-06-03 | End: 2022-11-01 | Stop reason: SDUPTHER

## 2022-06-03 RX ORDER — CLONAZEPAM 1 MG/1
1 TABLET ORAL
Qty: 30 TABLET | Refills: 0 | Status: SHIPPED | OUTPATIENT
Start: 2022-06-03 | End: 2022-11-01 | Stop reason: SDUPTHER

## 2022-11-01 ENCOUNTER — OFFICE VISIT (OUTPATIENT)
Dept: BEHAVIORAL/MENTAL HEALTH CLINIC | Age: 32
End: 2022-11-01
Payer: COMMERCIAL

## 2022-11-01 VITALS
TEMPERATURE: 97.8 F | RESPIRATION RATE: 17 BRPM | DIASTOLIC BLOOD PRESSURE: 75 MMHG | SYSTOLIC BLOOD PRESSURE: 123 MMHG | BODY MASS INDEX: 22.52 KG/M2 | HEIGHT: 62 IN | WEIGHT: 122.4 LBS | OXYGEN SATURATION: 98 % | HEART RATE: 95 BPM

## 2022-11-01 DIAGNOSIS — F41.9 ANXIETY: Primary | ICD-10-CM

## 2022-11-01 DIAGNOSIS — F31.77 BIPOLAR DISORDER, IN PARTIAL REMISSION, MOST RECENT EPISODE MIXED (HCC): ICD-10-CM

## 2022-11-01 PROCEDURE — 90792 PSYCH DIAG EVAL W/MED SRVCS: CPT | Performed by: NURSE PRACTITIONER

## 2022-11-01 RX ORDER — QUETIAPINE FUMARATE 400 MG/1
400 TABLET, FILM COATED ORAL
Qty: 30 TABLET | Refills: 3 | Status: SHIPPED | OUTPATIENT
Start: 2022-11-01

## 2022-11-01 RX ORDER — LORAZEPAM 1 MG/1
1 TABLET ORAL
Qty: 15 TABLET | Refills: 0 | Status: SHIPPED | OUTPATIENT
Start: 2022-11-01

## 2022-11-01 RX ORDER — CLONAZEPAM 1 MG/1
1 TABLET ORAL
Qty: 30 TABLET | Refills: 1 | Status: SHIPPED | OUTPATIENT
Start: 2022-11-01

## 2022-11-01 NOTE — PROGRESS NOTES
INITIAL PSYCHIATRIC EVALUATION    IDENTIFICATION:      A. Name: Hebert Hudson. Age:     29 y.o.      C.   MRN: [id number]       D.   CSN:      [id number]      KEISHA.   :     1990          CC: \"I'm doing ok\". HISTORY OF PRESENT ILLNESS:    The patient Lauryn Melvin is a 29 y.o.  female with a history of Bipolar 1 Disorder. She reports the following psychiatric symptoms:  anxiety. The symptoms have been present for years  and are of moderate severity. The symptoms are chronic in nature with acute exacerbations. Additional symptoms include agitation, difficulty sleeping, and relationship difficulties. Symptoms are precipitated by external  psychosocial stressors. She reports she is  from . Recently moved out and this is causing some anxiety, but patient is overall relatively stable. She is taking Seroquel 400 mg and Clonazepam 1 mg daily as needed. She is a transfer patient of Sarah Peters who is here to establish care with this provider.      PAST HISTORY:  Psychiatric:  Past Psychiatric Hospitalization:  Multiple   Past Outpatient Providers:  multiple   Past Psychiatric Medications:Lithium, risperidone, ativan, Restoril, Latuda, Viibryd, Ambien     Medical:  Active Ambulatory Problems     Diagnosis Date Noted    Bipolar I disorder in remission ([facility] [address] 2013    Hypermobile joints 2019    Bipolar 1 disorder ([facility] [address] 2012    Psychiatric disorder     Anxiety      Resolved Ambulatory Problems     Diagnosis Date Noted    No Resolved Ambulatory Problems     Past Medical History:   Diagnosis Date    Other ill-defined conditions(799.89)      Substance Use:   Social History     Socioeconomic History    Marital status:    Tobacco Use    Smoking status: Former     Packs/day: 0.25     Types: Cigarettes     Quit date: 2018     Years since quittin.8    Smokeless tobacco: Never    Tobacco comments:     occasional vaping at night   Substance and Sexual Activity    Alcohol use: Yes     Alcohol/week: 2.0 standard drinks     Types: 1 Glasses of wine, 1 Cans of beer per week    Drug use: No    Sexual activity: Yes     Partners: Male     Birth control/protection: Implant, Pill     Comment: nexplanon     Social:  Marital Status: ,  from  of 6 years   Children: 1 daughter age 1 Luciana   Educational Level:  some college   Work History: massage therapist, [employer]   Legal History: denies   Pertinent Childhood History: n/a      Family:  Family history of mental, medical or substance use history reported:     MEDICATIONS:  Current Outpatient Medications   Medication Sig Dispense Refill    QUEtiapine (SEROquel) 400 mg tablet Take 1 Tablet by mouth nightly. 30 Tablet 3    clonazePAM (KlonoPIN) 1 mg tablet Take 1 Tablet by mouth daily as needed for Anxiety. 30 Tablet 1    LORazepam (ATIVAN) 1 mg tablet Take 1 Tablet by mouth daily as needed for Anxiety (severe anxiety and  panic attack). Do not combine with Clonazepam or Opioids 15 Tablet 0    Levonorgestrel-Ethinyl Estrad 90-20 mcg (28) per tablet Take 1 Tablet by mouth daily. ALLERGIES:  Allergies   Allergen Reactions    Cephalosporins Rash    Seafood [Shellfish Containing Products] Shortness of Breath and Rash       REVIEW OF SYSTEMS:  Psychiatric symptoms being monitored for:  depression, anxiety, hypomania/margoth, agitation  Appetite:good   Sleep: improved     All other Systems reviewed and are as noted above.      MENTAL STATUS EXAM:   Sensorium  oriented to time, place and person   Relations cooperative   Appearance:  age appropriate and casually dressed   Motor Behavior:  gait stable and within normal limits   Speech:  normal volume   Thought Process: goal directed   Thought Content free of delusions and free of hallucinations   Suicidal ideations no intention   Homicidal ideations no intention   Mood:  within normal limits   Affect:  wnl   Memory recent  adequate   Memory remote:  adequate   Concentration: adequate   Abstraction:  abstract   Insight:  good   Reliability good    Judgment:  fair and good     VITALS:     Visit Vitals  /75 (BP 1 Location: Left upper arm, BP Patient Position: Sitting, BP Cuff Size: Adult)   Pulse 95   Temp 97.8 °F (36.6 °C) (Oral)   Resp 17   Ht 5' 2\" (1.575 m)   Wt 55.5 kg (122 lb 6.4 oz)   SpO2 98%   BMI 22.39 kg/m²       PERTINENT DATA:  No visits with results within 2 Day(s) from this visit. Latest known visit with results is:   Office Visit on 05/05/2022   Component Date Value Ref Range Status    TSH 05/05/2022 1.010  0.450 - 4.500 uIU/mL Final    T4, Free 05/05/2022 1.28  0.82 - 1.77 ng/dL Final    T3, total 05/05/2022 119  71 - 180 ng/dL Final    Glucose 05/05/2022 89  65 - 99 mg/dL Final    BUN 05/05/2022 15  6 - 20 mg/dL Final    Creatinine 05/05/2022 0.84  0.57 - 1.00 mg/dL Final    eGFR 05/05/2022 96  >59 mL/min/1.73 Final    BUN/Creatinine ratio 05/05/2022 18  9 - 23 Final    Sodium 05/05/2022 136  134 - 144 mmol/L Final    Potassium 05/05/2022 5.0  3.5 - 5.2 mmol/L Final    Chloride 05/05/2022 100  96 - 106 mmol/L Final    CO2 05/05/2022 18 (A)  20 - 29 mmol/L Final    Calcium 05/05/2022 9.2  8.7 - 10.2 mg/dL Final    Protein, total 05/05/2022 6.9  6.0 - 8.5 g/dL Final    Albumin 05/05/2022 4.6  3.9 - 5.0 g/dL Final    GLOBULIN, TOTAL 05/05/2022 2.3  1.5 - 4.5 g/dL Final    A-G Ratio 05/05/2022 2.0  1.2 - 2.2 Final    Bilirubin, total 05/05/2022 0.4  0.0 - 1.2 mg/dL Final    Alk.  phosphatase 05/05/2022 50  44 - 121 IU/L Final    AST (SGOT) 05/05/2022 25  0 - 40 IU/L Final    ALT (SGPT) 05/05/2022 11  0 - 32 IU/L Final    WBC 05/05/2022 5.7  3.4 - 10.8 x10E3/uL Final    RBC 05/05/2022 4.45  3.77 - 5.28 x10E6/uL Final    HGB 05/05/2022 13.1  11.1 - 15.9 g/dL Final    HCT 05/05/2022 39.6  34.0 - 46.6 % Final    MCV 05/05/2022 89  79 - 97 fL Final    MCH 05/05/2022 29.4  26.6 - 33.0 pg Final    MCHC 05/05/2022 33.1  31.5 - 35.7 g/dL Final    RDW 05/05/2022 12.5  11.7 - 15.4 % Final    PLATELET 45/59/4261 862  150 - 450 x10E3/uL Final    NEUTROPHILS 05/05/2022 65  Not Estab. % Final    Lymphocytes 05/05/2022 26  Not Estab. % Final    MONOCYTES 05/05/2022 7  Not Estab. % Final    EOSINOPHILS 05/05/2022 2  Not Estab. % Final    BASOPHILS 05/05/2022 0  Not Estab. % Final    ABS. NEUTROPHILS 05/05/2022 3.6  1.4 - 7.0 x10E3/uL Final    Abs Lymphocytes 05/05/2022 1.5  0.7 - 3.1 x10E3/uL Final    ABS. MONOCYTES 05/05/2022 0.4  0.1 - 0.9 x10E3/uL Final    ABS. EOSINOPHILS 05/05/2022 0.1  0.0 - 0.4 x10E3/uL Final    ABS. BASOPHILS 05/05/2022 0.0  0.0 - 0.2 x10E3/uL Final    IMMATURE GRANULOCYTES 05/05/2022 0  Not Estab. % Final    ABS. IMM. GRANS. 05/05/2022 0.0  0.0 - 0.1 x10E3/uL Final    VITAMIN D, 25-HYDROXY 05/05/2022 47.4  30.0 - 100.0 ng/mL Final       XR Results (most recent):  Results from Hospital Encounter encounter on 12/18/20    XR NECK SOFT TISSUE    Narrative  EXAM:  XR NECK SOFT TISSUE    INDICATION: Small firm area on the right lower jaw. COMPARISON: None. TECHNIQUE: Frontal and lateral neck views    FINDINGS: The mandible is normal in appearance. There is no bony lesion. Cervical spine alignment is within normal limits. The prevertebral soft tissues  are unremarkable. The epiglottis is normal. The airway is patent. Impression  IMPRESSION: No acute abnormality. ASSESSMENT/PLAN:  The patient Max Bearden is a 29 y.o.  female who presents at this time for treatment of the following diagnoses:    ICD-10-CM ICD-9-CM    1. Anxiety  F41.9 300.00 clonazePAM (KlonoPIN) 1 mg tablet      LORazepam (ATIVAN) 1 mg tablet      2. Bipolar disorder, in partial remission, most recent episode mixed (HCC)  F31.77 296.65 QUEtiapine (SEROquel) 400 mg tablet          Assessment:   Max Bearden is a 29 y.o. female and presents to outpatient face to face apt to establish care with this provider. Symptoms are stabilizing. She is doing well overall.  She is currently going through a separation and had one episode of aggressive behavior. She lashed out at spouse the night before she moved out. She denies any other episode. She does report some severe anxiety and disassociating during the day. Discussed past medical and psychiatric hx. Will add Ativan 1 mg for severe panic during the day. As clonazepam makes patient too drowsy and should be taken in the evening. She understand the benzo agreement and safety guidelines. Discussed risks vs benefits. Reviewed treatment goals and target symptoms to monitor for . Will continue to monitor. May consider Lamictal if patient continues to have irritability or hypomanic symptoms. Behavior is most likely caused by intense external stressors. Plan:  1. Medications:  Current Outpatient Medications   Medication Sig Dispense Refill    QUEtiapine (SEROquel) 400 mg tablet Take 1 Tablet by mouth nightly. 30 Tablet 3    clonazePAM (KlonoPIN) 1 mg tablet Take 1 Tablet by mouth daily as needed for Anxiety. 30 Tablet 1    LORazepam (ATIVAN) 1 mg tablet Take 1 Tablet by mouth daily as needed for Anxiety (severe anxiety and  panic attack). Do not combine with Clonazepam or Opioids 15 Tablet 0    Levonorgestrel-Ethinyl Estrad 90-20 mcg (28) per tablet Take 1 Tablet by mouth daily. The following regarding treatment was addressed with patient:   the risks and benefits of the proposed medication, instructions for management, education and risk factor reduction. The patient was given the opportunity to ask questions. Informed consent given to the use of the above medications. Adjust psychiatric medications as needed based upon diagnoses and response to treatment. 2.  Review previous labs and medical tests in the EHR and or transferring hospital documents. I will continue to order blood tests/labs and diagnostic tests as deemed appropriate and review results as they become available (see orders for details).     3.  Review old psychiatric and medical records available in the EHR. I will order additional psychiatric records from other institutions/providers as appropriate. 4.  Gather additional collateral information as appropriate.     5.  Supportive and/or Individual Therapy, Micheal Hernandez at [facility]         SIGNED:    Barbra Denton NP  11/1/2022

## 2022-11-01 NOTE — PROGRESS NOTES
Chief Complaint   Patient presents with    New Patient     Former Talia Boo NP patient. Visit Vitals  /75 (BP 1 Location: Left upper arm, BP Patient Position: Sitting, BP Cuff Size: Adult)   Pulse 95   Temp 97.8 °F (36.6 °C) (Oral)   Resp 17   Ht 5' 2\" (1.575 m)   Wt 55.5 kg (122 lb 6.4 oz)   SpO2 98%   BMI 22.39 kg/m²     Prior to Admission medications    Medication Sig Start Date End Date Taking? Authorizing Provider   QUEtiapine (SEROquel) 400 mg tablet Take 1 Tablet by mouth nightly. 6/3/22  Yes Rut Hairston NP   clonazePAM (KlonoPIN) 1 mg tablet Take 1 Tablet by mouth daily as needed for Anxiety. 6/3/22  Yes Rut Hairston NP   Levonorgestrel-Ethinyl Estrad 90-20 mcg (28) per tablet Take 1 Tablet by mouth daily.  4/4/22  Yes Provider, Historical     3 most recent PHQ Screens 11/1/2022   Little interest or pleasure in doing things Several days   Feeling down, depressed, irritable, or hopeless Several days   Total Score PHQ 2 2

## 2022-12-15 ENCOUNTER — OFFICE VISIT (OUTPATIENT)
Dept: BEHAVIORAL/MENTAL HEALTH CLINIC | Age: 32
End: 2022-12-15
Payer: COMMERCIAL

## 2022-12-15 VITALS
SYSTOLIC BLOOD PRESSURE: 106 MMHG | RESPIRATION RATE: 17 BRPM | OXYGEN SATURATION: 98 % | WEIGHT: 126.2 LBS | DIASTOLIC BLOOD PRESSURE: 82 MMHG | TEMPERATURE: 98.3 F | BODY MASS INDEX: 23.22 KG/M2 | HEART RATE: 87 BPM | HEIGHT: 62 IN

## 2022-12-15 DIAGNOSIS — F41.9 ANXIETY: ICD-10-CM

## 2022-12-15 DIAGNOSIS — F31.32 BIPOLAR 1 DISORDER, DEPRESSED, MODERATE (HCC): Primary | ICD-10-CM

## 2022-12-15 PROCEDURE — 99214 OFFICE O/P EST MOD 30 MIN: CPT | Performed by: NURSE PRACTITIONER

## 2022-12-15 RX ORDER — CLONAZEPAM 1 MG/1
1 TABLET ORAL
Qty: 15 TABLET | Refills: 3 | Status: SHIPPED | OUTPATIENT
Start: 2022-12-15

## 2022-12-15 RX ORDER — LORAZEPAM 1 MG/1
1 TABLET ORAL
Qty: 30 TABLET | Refills: 3 | Status: SHIPPED | OUTPATIENT
Start: 2022-12-15

## 2022-12-15 RX ORDER — TOPIRAMATE 25 MG/1
25 TABLET ORAL 2 TIMES DAILY
Qty: 60 TABLET | Refills: 3 | Status: SHIPPED | OUTPATIENT
Start: 2022-12-15

## 2022-12-15 NOTE — PROGRESS NOTES
CHIEF COMPLAINT:  Dominik Allison is a 29 y.o. female and was seen today for follow-up of psychiatric condition and psychotropic medication management. HPI:    Agnes Bee reports the following psychiatric symptoms by hx:  depression, agitation, anxiety, margoth and hypomania. Overall symptoms have been present for years. Currently symptoms are of moderate/high severity. The symptoms occur daily. Pt reports medications are beneficial.  Patient reports an exacerbation of depression and agitation since last Wednesday. She is  experiencing more irritability and explosive behavior. Precipitating factors include Mediation session Dec 1st. Met with pt for appt today  to review current treatment plan. FAMILY/SOCIAL HX: psychosocial stressors     REVIEW OF SYSTEMS:  Psychiatric symptoms being monitored for:  depression, anxiety, hypomania/margoth, agitation  Appetite:good   Sleep: improved    Neuro: denies    There were no vitals taken for this visit. Side Effects:  none    MENTAL STATUS EXAM:   Sensorium  oriented to time, place and person   Relations cooperative   Appearance:  age appropriate and casually dressed   Motor Behavior:  gait stable and within normal limits   Speech:  normal volume   Thought Process: goal directed   Thought Content free of delusions and free of hallucinations   Suicidal ideations no intention   Homicidal ideations no intention   Mood:  Irritable    Affect:  anxious   Memory recent  adequate   Memory remote:  adequate   Concentration:  adequate   Abstraction:  abstract   Insight:  fair and good   Reliability good and fair   Judgment:  fair and good     MEDICAL DECISION MAKING:  Problems addressed today:    ICD-10-CM ICD-9-CM    1. Bipolar 1 disorder, depressed, moderate (HCC)  F31.32 296.52 topiramate (TOPAMAX) 25 mg tablet      2. Anxiety  F41.9 300.00 LORazepam (ATIVAN) 1 mg tablet      clonazePAM (KlonoPIN) 1 mg tablet          Assessment:   Agnes Bee is not responding to treatment. Symptoms are exacerbated. Patient reports in increase in depression, anxiety, and irritability. She reports having some paranoia surrounding living alone. Reinforced positive coping strategies. Discussed current medications and dosages. Discussed Lamictal, Topamax and Trileptal. Due to possible side effects we will try Topamax first. If ineffective or if pt experiences intolerable side effects, will try Lamictal. Risks vs benefits and side effects discussed. Patient is aware of benzo safety guidelines and PRN use. Reviewed treatment goals and target symptoms to monitor for. Plan:   1. Current Outpatient Medications   Medication Sig Dispense Refill    LORazepam (ATIVAN) 1 mg tablet Take 1 Tablet by mouth daily as needed for Anxiety (severe anxiety and  panic attack). Do not combine with Clonazepam or Opioids 30 Tablet 3    clonazePAM (KlonoPIN) 1 mg tablet Take 1 Tablet by mouth daily as needed for Anxiety. 15 Tablet 3    topiramate (TOPAMAX) 25 mg tablet Take 1 Tablet by mouth two (2) times a day. 60 Tablet 3    QUEtiapine (SEROquel) 400 mg tablet Take 1 Tablet by mouth nightly. 30 Tablet 3    Levonorgestrel-Ethinyl Estrad 90-20 mcg (28) per tablet Take 1 Tablet by mouth daily. medication changes made today: Topamax 25 mg BID    2. Counseling and coordination of care including instructions for treatment, risks/benefits, risk factor reduction and patient/family education. She agrees with the plan. Patient instructed to call with any side effects, questions or issues.          12/15/2022  Malik Michaels NP

## 2022-12-15 NOTE — PROGRESS NOTES
Chief Complaint   Patient presents with    Medication Management     Visit Vitals  /82 (BP 1 Location: Left upper arm, BP Patient Position: Sitting, BP Cuff Size: Adult)   Pulse 87   Temp 98.3 °F (36.8 °C) (Oral)   Resp 17   Ht 5' 2\" (1.575 m)   Wt 57.2 kg (126 lb 3.2 oz)   SpO2 98%   BMI 23.08 kg/m²     3 most recent PHQ Screens 11/1/2022   Little interest or pleasure in doing things Several days   Feeling down, depressed, irritable, or hopeless Several days   Total Score PHQ 2 2     1. Have you been to the ER, urgent care clinic since your last visit? Hospitalized since your last visit? [facility] - 11/[facility]22 - Sinus InfectionNo    2. Have you seen or consulted any other health care providers outside of the [address] [place] [address] since your last visit? Include any pap smears or colon screening.  No

## 2023-01-12 ENCOUNTER — VIRTUAL VISIT (OUTPATIENT)
Dept: BEHAVIORAL/MENTAL HEALTH CLINIC | Age: 33
End: 2023-01-12
Payer: COMMERCIAL

## 2023-01-12 DIAGNOSIS — F41.9 ANXIETY: ICD-10-CM

## 2023-01-12 DIAGNOSIS — F31.60 BIPOLAR 1 DISORDER, MIXED (HCC): Primary | ICD-10-CM

## 2023-01-12 PROCEDURE — 99214 OFFICE O/P EST MOD 30 MIN: CPT | Performed by: NURSE PRACTITIONER

## 2023-01-12 RX ORDER — LAMOTRIGINE 25 MG/1
TABLET ORAL
Qty: 30 TABLET | Refills: 1 | Status: SHIPPED | OUTPATIENT
Start: 2023-01-12

## 2023-01-12 NOTE — PROGRESS NOTES
CHIEF COMPLAINT:  Rain Fry is a 29 y.o. female and was seen today for follow-up of psychiatric condition and psychotropic medication management. HPI:    Joseph Bolton reports the following psychiatric symptoms by hx:  depression, agitation, anxiety, margoth and hypomania. Overall symptoms have been present for years. Currently symptoms are of moderate/high severity. The symptoms occur daily. Pt reports medications are beneficial.  Patient reports some improvement, but overall still experiencing an exacerbation of symptoms. Met with pt for appt today  to review current treatment plan. FAMILY/SOCIAL HX: psychosocial stressors     REVIEW OF SYSTEMS:  Psychiatric symptoms being monitored for:  depression, anxiety, hypomania/margoth, agitation  Appetite:good   Sleep: improved    Neuro: denies    There were no vitals taken for this visit. Side Effects:   hyper sensitivity, facial numbness    MENTAL STATUS EXAM:   Sensorium  oriented to time, place and person   Relations cooperative   Appearance:  age appropriate and casually dressed   Motor Behavior:   within normal limits   Speech:  normal volume   Thought Process: goal directed   Thought Content free of delusions and free of hallucinations   Suicidal ideations no intention   Homicidal ideations no intention   Mood:  Irritable    Affect:  anxious   Memory recent  adequate   Memory remote:  adequate   Concentration:  adequate   Abstraction:  abstract   Insight:  fair and good   Reliability good and fair   Judgment:  fair and good     MEDICAL DECISION MAKING:  Problems addressed today:    ICD-10-CM ICD-9-CM    1. Bipolar 1 disorder, mixed ([facility])  F31.60 296.60 lamoTRIgine (LaMICtal) 25 mg tablet      2. Anxiety  F41.9 300.00           Assessment:   Joseph Bolton is  slightly responding to treatment. Symptoms are slightly improved, but patient continues to struggle with anxiety, feeling of paranoia at night, feeling agitated, and mood dysregulation.  Discussed current medications and dosages. Will d/c Topamax. Will start Lamictal. Discussed risks vs benefits, and side effects, and important things to look out for. Discussed using back up contraception. Patient acknowledged. Will start with half tablet for 7 days then increase to 1 tablet. She is to continue seeing therapist.. Reviewed treatment goals and target symptoms to monitor for. Will continue to monitor. Plan:   1. Current Outpatient Medications   Medication Sig Dispense Refill    lamoTRIgine (LaMICtal) 25 mg tablet Take half of a tablet by mouth daily for 7 days, then increase to 1 whole tablet by mouth daily. 30 Tablet 1    LORazepam (ATIVAN) 1 mg tablet Take 1 Tablet by mouth daily as needed for Anxiety (severe anxiety and  panic attack). Do not combine with Clonazepam or Opioids 30 Tablet 3    clonazePAM (KlonoPIN) 1 mg tablet Take 1 Tablet by mouth daily as needed for Anxiety. 15 Tablet 3    QUEtiapine (SEROquel) 400 mg tablet Take 1 Tablet by mouth nightly. 30 Tablet 3    Levonorgestrel-Ethinyl Estrad 90-20 mcg (28) per tablet Take 1 Tablet by mouth daily. medication changes made today: D/c Topamax, Start Lamictal 25 mg     2. Counseling and coordination of care including instructions for treatment, risks/benefits, risk factor reduction and patient/family education. She agrees with the plan. Patient instructed to call with any side effects, questions or issues. 3.    Follow-up and Dispositions    Return in about 4 weeks (around 2/9/2023) for med/managment . Max Bearden, was evaluated through a synchronous (real-time) audio-video encounter. The patient (or guardian if applicable) is aware that this is a billable service, which includes applicable co-pays. This Virtual Visit was conducted with patient's (and/or legal guardian's) consent.  The visit was conducted pursuant to the emergency declaration under the [facility], [address]5 waiver authority and the Coronavirus Preparedness and Response Supplemental Appropriations Act. Patient identification was verified, and a caregiver was present when appropriate. The patient was located at: Home: [address] [address]  The provider was located at: [facility] [address]       An electronic signature was used to authenticate this note.   -- Kelly Rodriguez NP          1/12/2023

## 2023-02-17 ENCOUNTER — VIRTUAL VISIT (OUTPATIENT)
Dept: BEHAVIORAL/MENTAL HEALTH CLINIC | Age: 33
End: 2023-02-17
Payer: COMMERCIAL

## 2023-02-17 DIAGNOSIS — F41.9 ANXIETY: ICD-10-CM

## 2023-02-17 DIAGNOSIS — F31.60 BIPOLAR 1 DISORDER, MIXED (HCC): Primary | ICD-10-CM

## 2023-02-17 DIAGNOSIS — F31.77 BIPOLAR DISORDER, IN PARTIAL REMISSION, MOST RECENT EPISODE MIXED (HCC): ICD-10-CM

## 2023-02-17 RX ORDER — QUETIAPINE FUMARATE 400 MG/1
400 TABLET, FILM COATED ORAL
Qty: 30 TABLET | Refills: 3 | Status: SHIPPED | OUTPATIENT
Start: 2023-02-17

## 2023-02-17 NOTE — PROGRESS NOTES
CHIEF COMPLAINT:  Devon Ash is a 29 y.o. female and was seen today for follow-up of psychiatric condition and psychotropic medication management. HPI:    Linden Marinelli reports the following psychiatric symptoms by hx:  depression, agitation, anxiety, margoth and hypomania. Overall symptoms have been present for years. Currently symptoms are of moderate/high severity. The symptoms occur daily. Pt reports medications are beneficial.  Patient reports an improvement of symptoms. Met with pt via telehealth video  for appt today  to review current treatment plan. FAMILY/SOCIAL HX: psychosocial stressors     REVIEW OF SYSTEMS:  Psychiatric symptoms being monitored for:  depression, anxiety, hypomania/margoth, agitation  Appetite:good   Sleep: improved    Neuro: denies    There were no vitals taken for this visit. Side Effects:  none    MENTAL STATUS EXAM:   Sensorium  oriented to time, place and person   Relations cooperative   Appearance:  age appropriate and casually dressed   Motor Behavior:   within normal limits   Speech:  normal volume   Thought Process: goal directed   Thought Content free of delusions and free of hallucinations   Suicidal ideations no intention   Homicidal ideations no intention   Mood:  \"Pretty even\"   Affect: WNL   Memory recent  adequate   Memory remote:  adequate   Concentration:  adequate   Abstraction:  abstract   Insight:  fair and good   Reliability good and fair   Judgment:  fair and good     MEDICAL DECISION MAKING:  Problems addressed today:    ICD-10-CM ICD-9-CM    1. [facility]  F31.60 296.60       2. Anxiety  F41.9 300.00       3. Bipolar disorder, in partial remission, most recent episode mixed ([facility])  F31.77 296.65 QUEtiapine (SEROquel) 400 mg tablet            Assessment:   Linden Marinelli is responding to treatment. Symptoms are improving. Patient report she is doing well overall. Discussed current medications and dosages. No changes made today.  Discussed possibly increasing Lamictal at the next apt and consider decreasing Seroquel due to day time sleepiness. Patient understands benzo safety guidelines. Patient understands and is aware not to combine Ativan and Clonazepam together. She denies SI. Will continue to monitor. Reviewed treatment goals and target symptoms to monitor for. Plan:   1. Current Outpatient Medications   Medication Sig Dispense Refill    QUEtiapine (SEROquel) 400 mg tablet Take 1 Tablet by mouth nightly. 30 Tablet 3    lamoTRIgine (LaMICtal) 25 mg tablet Take half of a tablet by mouth daily for 7 days, then increase to 1 whole tablet by mouth daily. 30 Tablet 1    LORazepam (ATIVAN) 1 mg tablet Take 1 Tablet by mouth daily as needed for Anxiety (severe anxiety and  panic attack). Do not combine with Clonazepam or Opioids 30 Tablet 3    clonazePAM (KlonoPIN) 1 mg tablet Take 1 Tablet by mouth daily as needed for Anxiety. 15 Tablet 3    Levonorgestrel-Ethinyl Estrad 90-20 mcg (28) per tablet Take 1 Tablet by mouth daily. medication changes made today: Continue Seroquel 400 mg po nightly, Continue Lamictal 25 mg po daily, Continue Ativan 1 mg po daily as needed, Continue Clonazepam 1 mg po daily as needed. 2.  Counseling and coordination of care including instructions for treatment, risks/benefits, risk factor reduction and patient/family education. She agrees with the plan. Patient instructed to call with any side effects, questions or issues. Ever Murrell was evaluated through a synchronous (real-time) audio-video encounter. The patient (or guardian if applicable) is aware that this is a billable service, which includes applicable co-pays. This Virtual Visit was conducted with patient's (and/or legal guardian's) consent.  The visit was conducted pursuant to the emergency declaration under the [address] [facility], [address]5 waiver authority and the [employer] Appropriations Act. Patient identification was verified, and a caregiver was present when appropriate. The patient was located at: Home: [address] [address]  The provider was located at: Facility ([facility] Department): [address] [place] [address]       An electronic signature was used to authenticate this note.   -- Anselmo Almanzar NP       2/17/2023  Anselmo Almanzar NP

## 2023-03-30 ENCOUNTER — OFFICE VISIT (OUTPATIENT)
Dept: BEHAVIORAL/MENTAL HEALTH CLINIC | Age: 33
End: 2023-03-30

## 2023-03-30 VITALS
DIASTOLIC BLOOD PRESSURE: 82 MMHG | RESPIRATION RATE: 16 BRPM | BODY MASS INDEX: 24.44 KG/M2 | TEMPERATURE: 97.6 F | HEIGHT: 62 IN | SYSTOLIC BLOOD PRESSURE: 111 MMHG | OXYGEN SATURATION: 98 % | WEIGHT: 132.8 LBS | HEART RATE: 95 BPM

## 2023-03-30 DIAGNOSIS — F31.60 BIPOLAR 1 DISORDER, MIXED (HCC): ICD-10-CM

## 2023-03-30 DIAGNOSIS — F31.77 BIPOLAR DISORDER, IN PARTIAL REMISSION, MOST RECENT EPISODE MIXED (HCC): ICD-10-CM

## 2023-03-30 DIAGNOSIS — F41.9 ANXIETY: ICD-10-CM

## 2023-03-30 RX ORDER — LORAZEPAM 1 MG/1
1 TABLET ORAL
Qty: 30 TABLET | Refills: 3 | Status: SHIPPED | OUTPATIENT
Start: 2023-04-15

## 2023-03-30 RX ORDER — QUETIAPINE FUMARATE 300 MG/1
300 TABLET, FILM COATED ORAL
Qty: 90 TABLET | Refills: 1 | Status: SHIPPED | OUTPATIENT
Start: 2023-03-30

## 2023-03-30 RX ORDER — LAMOTRIGINE 25 MG/1
50 TABLET ORAL DAILY
Qty: 180 TABLET | Refills: 1 | Status: SHIPPED | OUTPATIENT
Start: 2023-04-06

## 2023-03-30 NOTE — PROGRESS NOTES
CHIEF COMPLAINT:  Ever Murrell is a 29 y.o. female and was seen today for follow-up of psychiatric condition and psychotropic medication management. HPI:    Shama Martin reports the following psychiatric symptoms by hx:  depression, agitation, anxiety, margoth and hypomania. Overall symptoms have been present for years. Currently symptoms are of moderate/high severity. The symptoms occur daily. Pt reports medications are beneficial.  Patient reports an improvement of symptoms, but an exacerbation in anxiety. Reports approximately 2 weeks ago she experienced a severe panic attack. She experienced seizure like activity, difficulty with expression, delusions, and dissociation. Patient is doing through a divorce and it was in its final stages. This is most likely the cause for anxiety. She denies alleviating factors. Met with pt today to review current treatment plan. FAMILY/SOCIAL HX: psychosocial stressors     REVIEW OF SYSTEMS:  Psychiatric symptoms being monitored for:  depression, anxiety, hypomania/margoth, agitation  Appetite:good   Sleep: improved    Neuro: denies    Visit Vitals  /82 (BP 1 Location: Left upper arm, BP Patient Position: Sitting, BP Cuff Size: Adult)   Pulse 95   Temp 97.6 °F (36.4 °C) (Oral)   Resp 16   Ht 5' 2\" (1.575 m)   Wt 60.2 kg (132 lb 12.8 oz)   SpO2 98%   BMI 24.29 kg/m²       Side Effects:  somnolence    MENTAL STATUS EXAM:   Sensorium  oriented to time, place and person   Relations cooperative   Appearance:  age appropriate and casually dressed   Motor Behavior:   within normal limits   Speech:  normal volume   Thought Process: goal directed   Thought Content free of delusions and free of hallucinations   Suicidal ideations no intention   Homicidal ideations no intention   Mood:   \"Anxious    Affect:  Anxious    Memory recent  adequate   Memory remote:  adequate   Concentration:  adequate   Abstraction:  abstract   Insight:  fair and good   Reliability good and fair Judgment:  fair and good     MEDICAL DECISION MAKING:  Problems addressed today:    ICD-10-CM ICD-9-CM    1. Bipolar disorder, in partial remission, most recent episode mixed (HCC)  F31.77 296.65 QUEtiapine (SEROquel) 300 mg tablet      2. Bipolar 1 disorder, mixed (HCC)  F31.60 296.60 lamoTRIgine (LaMICtal) 25 mg tablet        DD: Conversion Disorder     Assessment:   Maria Isabel Garcia is responding to treatment. Symptoms are slightly exacerbated. Patient reports Seroquel 400 mg may be too strong for her now. She is too drowsy and has difficulty with waking in the mornings. Discussed current medications and dosages. She expressed her desire to decrease dosage. Will decrease Seroquel to 300 mg and increase Lamictal to 50 mg for patient to start next week. Risks vs benefits discussed. Patient  instructed to call the office for any rebound in symptoms or complications. She understands benzo safety guidelines. Reviewed treatment goals and target symptoms to monitor for. Plan:   1. Current Outpatient Medications   Medication Sig Dispense Refill    QUEtiapine (SEROquel) 300 mg tablet Take 1 Tablet by mouth nightly. 90 Tablet 1    [START ON 4/6/2023] lamoTRIgine (LaMICtal) 25 mg tablet Take 2 Tablets by mouth daily. 180 Tablet 1    LORazepam (ATIVAN) 1 mg tablet Take 1 Tablet by mouth daily as needed for Anxiety (severe anxiety and  panic attack). Do not combine with Clonazepam or Opioids 30 Tablet 3    clonazePAM (KlonoPIN) 1 mg tablet Take 1 Tablet by mouth daily as needed for Anxiety. 15 Tablet 3    Levonorgestrel-Ethinyl Estrad 90-20 mcg (28) per tablet Take 1 Tablet by mouth daily. (Patient not taking: Reported on 3/30/2023)            medication changes made today: Taper to Seroquel 300 mg, and increase Lamictal to 50 mg     2. Counseling and coordination of care including instructions for treatment, risks/benefits, risk factor reduction and patient/family education. She agrees with the plan.  Patient instructed to call with any side effects, questions or issues.             3/30/2023  Katty Lomas NP

## 2023-03-30 NOTE — PROGRESS NOTES
Chief Complaint   Patient presents with    Medication Management     Visit Vitals  /82 (BP 1 Location: Left upper arm, BP Patient Position: Sitting, BP Cuff Size: Adult)   Pulse 95   Temp 97.6 °F (36.4 °C) (Oral)   Resp 16   Ht 5' 2\" (1.575 m)   Wt 60.2 kg (132 lb 12.8 oz)   SpO2 98%   BMI 24.29 kg/m²     3 most recent PHQ Screens 3/30/2023   Little interest or pleasure in doing things Not at all   Feeling down, depressed, irritable, or hopeless Several days   Total Score PHQ 2 1   Trouble falling or staying asleep, or sleeping too much Not at all   Feeling tired or having little energy More than half the days   Poor appetite, weight loss, or overeating Not at all   Feeling bad about yourself - or that you are a failure or have let yourself or your family down Several days   Trouble concentrating on things such as school, work, reading, or watching TV Not at all   Moving or speaking so slowly that other people could have noticed; or the opposite being so fidgety that others notice Not at all   Thoughts of being better off dead, or hurting yourself in some way Not at all   PHQ 9 Score 4   How difficult have these problems made it for you to do your work, take care of your home and get along with others Somewhat difficult     1. Have you been to the ER, urgent care clinic since your last visit? Hospitalized since your last visit? No    2. Have you seen or consulted any other health care providers outside of the [address] [place] [address] since your last visit? Include any pap smears or colon screening.  No

## 2023-04-05 ENCOUNTER — TELEPHONE (OUTPATIENT)
Dept: BEHAVIORAL/MENTAL HEALTH CLINIC | Age: 33
End: 2023-04-05

## 2023-04-24 DIAGNOSIS — F31.77 BIPOLAR DISORDER, IN PARTIAL REMISSION, MOST RECENT EPISODE MIXED (HCC): Primary | ICD-10-CM

## 2023-04-24 RX ORDER — QUETIAPINE 200 MG/1
200 TABLET, FILM COATED, EXTENDED RELEASE ORAL
Qty: 30 TABLET | Refills: 1 | Status: SHIPPED | OUTPATIENT
Start: 2023-04-24

## 2023-04-24 NOTE — PROGRESS NOTES
Switched to Seroquel to 200 mg XR. Risks vs benefits discussed. Patient has been instructed to start on the days she doesn't have to get up as early. If she continues to struggle with side effects, will go to Seroquel 150 mg.

## 2023-05-12 ENCOUNTER — TELEMEDICINE (OUTPATIENT)
Age: 33
End: 2023-05-12
Payer: COMMERCIAL

## 2023-05-12 DIAGNOSIS — F31.77 BIPOLAR DISORDER, IN PARTIAL REMISSION, MOST RECENT EPISODE MIXED (HCC): Primary | ICD-10-CM

## 2023-05-12 PROCEDURE — 99214 OFFICE O/P EST MOD 30 MIN: CPT | Performed by: NURSE PRACTITIONER

## 2023-05-12 RX ORDER — LAMOTRIGINE 100 MG/1
100 TABLET ORAL DAILY
Qty: 90 TABLET | Refills: 1 | Status: SHIPPED | OUTPATIENT
Start: 2023-05-12

## 2023-05-12 RX ORDER — QUETIAPINE FUMARATE 150 MG/1
150 TABLET, FILM COATED ORAL NIGHTLY
Qty: 30 TABLET | Refills: 2
Start: 2023-05-12

## 2023-05-12 ASSESSMENT — PATIENT HEALTH QUESTIONNAIRE - PHQ9
3. TROUBLE FALLING OR STAYING ASLEEP: 0
10. IF YOU CHECKED OFF ANY PROBLEMS, HOW DIFFICULT HAVE THESE PROBLEMS MADE IT FOR YOU TO DO YOUR WORK, TAKE CARE OF THINGS AT HOME, OR GET ALONG WITH OTHER PEOPLE: 2
5. POOR APPETITE OR OVEREATING: 0
2. FEELING DOWN, DEPRESSED OR HOPELESS: 2
1. LITTLE INTEREST OR PLEASURE IN DOING THINGS: 1
8. MOVING OR SPEAKING SO SLOWLY THAT OTHER PEOPLE COULD HAVE NOTICED. OR THE OPPOSITE, BEING SO FIGETY OR RESTLESS THAT YOU HAVE BEEN MOVING AROUND A LOT MORE THAN USUAL: 1
SUM OF ALL RESPONSES TO PHQ QUESTIONS 1-9: 8
4. FEELING TIRED OR HAVING LITTLE ENERGY: 1
SUM OF ALL RESPONSES TO PHQ QUESTIONS 1-9: 7
6. FEELING BAD ABOUT YOURSELF - OR THAT YOU ARE A FAILURE OR HAVE LET YOURSELF OR YOUR FAMILY DOWN: 2
SUM OF ALL RESPONSES TO PHQ9 QUESTIONS 1 & 2: 3
SUM OF ALL RESPONSES TO PHQ QUESTIONS 1-9: 8
9. THOUGHTS THAT YOU WOULD BE BETTER OFF DEAD, OR OF HURTING YOURSELF: 1
SUM OF ALL RESPONSES TO PHQ QUESTIONS 1-9: 8

## 2023-05-12 ASSESSMENT — COLUMBIA-SUICIDE SEVERITY RATING SCALE - C-SSRS
6. HAVE YOU EVER DONE ANYTHING, STARTED TO DO ANYTHING, OR PREPARED TO DO ANYTHING TO END YOUR LIFE?: NO
1. WITHIN THE PAST MONTH, HAVE YOU WISHED YOU WERE DEAD OR WISHED YOU COULD GO TO SLEEP AND NOT WAKE UP?: YES
2. HAVE YOU ACTUALLY HAD ANY THOUGHTS OF KILLING YOURSELF?: NO

## 2023-05-12 NOTE — PROGRESS NOTES
CHIEF COMPLAINT:  Deon Schreiber is a 29 y.o. female and was seen today for follow-up of psychiatric condition and psychotropic medication management. HPI:    Kranthi Patterson reports the following psychiatric symptoms by hx:  depression, agitation, anxiety, edward and hypomania. Overall symptoms have been present for years. Currently symptoms are of moderate/high severity. The symptoms occur daily. Patient reports an exacerbation in mood swings, agitation, anxiety, and depression. Pt reports medications are beneficial.  Met with pt via video telehealth for appt today or to review current treatment plan. FAMILY/SOCIAL HX: psychosocial stressors     REVIEW OF SYSTEMS:  Psychiatric symptoms being monitored for:  depression, anxiety, hypomania/edward, agitation  Appetite:good   Sleep: improved    Neuro: denies    There were no vitals taken for this visit. Side Effects:  none    MENTAL STATUS EXAM:   Sensorium  oriented to time, place and person   Relations cooperative   Appearance:  age appropriate and casually dressed   Motor Behavior:   within normal limits   Speech:  normal volume   Thought Process: goal directed   Thought Content free of delusions and free of hallucinations   Suicidal ideations no intention   Homicidal ideations no intention   Mood:  Labile    Affect:  Anxious    Memory recent  adequate   Memory remote:  adequate   Concentration:  adequate   Abstraction:  abstract   Insight:  fair and good   Reliability good and fair   Judgment:  fair and good        MEDICAL DECISION MAKING:  Problems addressed today:   Diagnosis Orders   1. Bipolar disorder, in partial remission, most recent episode mixed (HCC)  QUEtiapine 150 MG TABS    lamoTRIgine (LAMICTAL) 100 MG tablet        Assessment:   Kranthi Patterson is  responding to treatment overall. Symptoms are exacerbated due to external stressors. Patient is working with therapist. Discussed current medications and dosages. Will increase Lamictal to 100 mg.  Patient is now

## 2023-06-21 RX ORDER — QUETIAPINE 200 MG/1
TABLET, FILM COATED, EXTENDED RELEASE ORAL
Qty: 30 TABLET | Refills: 0 | Status: SHIPPED | OUTPATIENT
Start: 2023-06-21 | End: 2023-07-13 | Stop reason: ALTCHOICE

## 2023-07-13 ENCOUNTER — TELEMEDICINE (OUTPATIENT)
Age: 33
End: 2023-07-13
Payer: COMMERCIAL

## 2023-07-13 DIAGNOSIS — F31.77 BIPOLAR DISORDER, IN PARTIAL REMISSION, MOST RECENT EPISODE MIXED (HCC): Primary | ICD-10-CM

## 2023-07-13 DIAGNOSIS — F41.9 ANXIETY DISORDER, UNSPECIFIED TYPE: ICD-10-CM

## 2023-07-13 PROCEDURE — 99214 OFFICE O/P EST MOD 30 MIN: CPT | Performed by: NURSE PRACTITIONER

## 2023-07-13 RX ORDER — LAMOTRIGINE 200 MG/1
200 TABLET ORAL DAILY
Qty: 90 TABLET | Refills: 1 | Status: SHIPPED | OUTPATIENT
Start: 2023-07-13

## 2023-07-13 RX ORDER — QUETIAPINE FUMARATE 150 MG/1
150 TABLET, FILM COATED ORAL NIGHTLY
Qty: 90 TABLET | Refills: 1 | Status: SHIPPED | OUTPATIENT
Start: 2023-07-13

## 2023-07-13 RX ORDER — LORAZEPAM 1 MG/1
1 TABLET ORAL DAILY PRN
Qty: 30 TABLET | Refills: 2 | Status: SHIPPED | OUTPATIENT
Start: 2023-07-13 | End: 2023-10-11

## 2023-07-13 NOTE — PROGRESS NOTES
CHIEF COMPLAINT:  Merary Mai is a 34 y.o. female and was seen today for follow-up of psychiatric condition and psychotropic medication management. HPI:    Marah James reports the following psychiatric symptoms by hx:  depression, agitation, anxiety, edward and hypomania. Overall symptoms have been present for years. Currently symptoms are of moderate/high severity. The symptoms occur daily. Pt reports medications are beneficial.  Patient reports an improvement of symptoms. Patient has been taking Lamictal 200 mg for about 3 weeks. Met with pt via video telehealth for appt today to review current treatment plan. FAMILY/SOCIAL HX: psychosocial stressors     REVIEW OF SYSTEMS:  Psychiatric symptoms being monitored for:  depression, anxiety, hypomania/edward, agitation  Appetite:good   Sleep: improved    Neuro: denies    There were no vitals taken for this visit. Side Effects:  none    MENTAL STATUS EXAM:   Sensorium  oriented to time, place and person   Relations cooperative   Appearance:  age appropriate and casually dressed   Motor Behavior:   within normal limits   Speech:  normal volume   Thought Process: goal directed   Thought Content free of delusions and free of hallucinations   Suicidal ideations no intention   Homicidal ideations no intention   Mood:  \"better\"   Affect:  Wnl     Memory recent  adequate   Memory remote:  adequate   Concentration:  adequate   Abstraction:  abstract   Insight:  fair and good   Reliability good and fair   Judgment:  fair and good        MEDICAL DECISION MAKING:  Problems addressed today:   Diagnosis Orders   1. Anxiety disorder, unspecified type  LORazepam (ATIVAN) 1 MG tablet      2. Bipolar disorder, in partial remission, most recent episode mixed (HCC)  lamoTRIgine (LAMICTAL) 200 MG tablet    QUEtiapine Fumarate 150 MG TABS            Assessment:   Marah James is  responding to treatment. Symptoms are less in severity and less in occurrence. She feels closer to baseline.

## 2023-08-15 ENCOUNTER — TELEPHONE (OUTPATIENT)
Age: 33
End: 2023-08-15

## 2023-08-15 NOTE — TELEPHONE ENCOUNTER
Patient LVM stating she could not get the following meds refilled   [phone]   QUEtiapine 150 MG TABS [[id number]]

## 2023-08-16 ENCOUNTER — TELEPHONE (OUTPATIENT)
Age: 33
End: 2023-08-16

## 2023-08-16 ENCOUNTER — CLINICAL DOCUMENTATION (OUTPATIENT)
Age: 33
End: 2023-08-16

## 2023-08-16 DIAGNOSIS — F31.77 BIPOLAR DISORDER, IN PARTIAL REMISSION, MOST RECENT EPISODE MIXED (HCC): ICD-10-CM

## 2023-08-16 RX ORDER — QUETIAPINE FUMARATE 150 MG/1
150 TABLET, FILM COATED ORAL NIGHTLY
Qty: 90 TABLET | Refills: 1 | Status: CANCELLED | OUTPATIENT
Start: 2023-08-16

## 2023-08-16 NOTE — TELEPHONE ENCOUNTER
Spoke to [employer] regarding refill of Seroquel 150mg. Per pharmacist, the medication is on back order until September. Informed patient of above information. Asked her to call pharmacies to see who had it in stock. Will had new provider if she will fill until next appointment. Patient informed if she will not , then patient will have to contact PCP. Patient expressed an understanding and had no further questions.

## 2023-08-16 NOTE — TELEPHONE ENCOUNTER
This is the patient that I spoke to you about this morning who said the [facility] was out of stock on Seroquel #90. I called the pharmacy twice. First time  they confirmed no 90 day supply on hand. I called a second time to see when she last picked up the medication to which the pharmacist said they had a 30 day supply available. They will fill it for the patient. Left  for patient. Maybe we can look at moving her appt to mid-September (4 patients)? Please advise.

## 2023-10-02 ENCOUNTER — TELEPHONE (OUTPATIENT)
Age: 33
End: 2023-10-02

## 2023-10-02 NOTE — TELEPHONE ENCOUNTER
[facility] called [facility] not have rx Quetiapine 150mg pt stated she will contact [facility] and will give us a call back pt not sure were to get rx pt call back number is [phone].  Sdh

## 2023-10-13 DIAGNOSIS — F31.77 BIPOLAR DISORDER, IN PARTIAL REMISSION, MOST RECENT EPISODE MIXED (HCC): ICD-10-CM

## 2023-10-13 RX ORDER — LAMOTRIGINE 200 MG/1
200 TABLET ORAL DAILY
Qty: 90 TABLET | Refills: 1 | Status: SHIPPED | OUTPATIENT
Start: 2023-10-13